# Patient Record
Sex: MALE | Race: WHITE | NOT HISPANIC OR LATINO | Employment: UNEMPLOYED | URBAN - METROPOLITAN AREA
[De-identification: names, ages, dates, MRNs, and addresses within clinical notes are randomized per-mention and may not be internally consistent; named-entity substitution may affect disease eponyms.]

---

## 2020-06-01 ENCOUNTER — COMMUNICATION - HEALTHEAST (OUTPATIENT)
Dept: CARDIOLOGY | Facility: CLINIC | Age: 60
End: 2020-06-01

## 2021-05-05 ENCOUNTER — OFFICE VISIT (OUTPATIENT)
Dept: FAMILY MEDICINE | Facility: CLINIC | Age: 61
End: 2021-05-05
Payer: COMMERCIAL

## 2021-05-05 VITALS
HEART RATE: 98 BPM | WEIGHT: 230.6 LBS | DIASTOLIC BLOOD PRESSURE: 98 MMHG | TEMPERATURE: 98.1 F | RESPIRATION RATE: 16 BRPM | SYSTOLIC BLOOD PRESSURE: 165 MMHG

## 2021-05-05 DIAGNOSIS — M79.2 CERVICAL SPINE ARTHRITIS WITH NERVE PAIN: Primary | ICD-10-CM

## 2021-05-05 DIAGNOSIS — F32.1 CURRENT MODERATE EPISODE OF MAJOR DEPRESSIVE DISORDER WITHOUT PRIOR EPISODE (H): ICD-10-CM

## 2021-05-05 DIAGNOSIS — M47.812 CERVICAL SPINE ARTHRITIS WITH NERVE PAIN: Primary | ICD-10-CM

## 2021-05-05 PROCEDURE — 99204 OFFICE O/P NEW MOD 45 MIN: CPT | Mod: GC | Performed by: FAMILY MEDICINE

## 2021-05-05 RX ORDER — LANOLIN ALCOHOL/MO/W.PET/CERES
3-6 CREAM (GRAM) TOPICAL
Qty: 60 TABLET | Refills: 1 | Status: SHIPPED | OUTPATIENT
Start: 2021-05-05 | End: 2022-09-08

## 2021-05-05 RX ORDER — ACETAMINOPHEN 500 MG
500-1000 TABLET ORAL EVERY 8 HOURS PRN
Qty: 90 TABLET | Refills: 1 | Status: SHIPPED | OUTPATIENT
Start: 2021-05-05 | End: 2022-04-29

## 2021-05-05 RX ORDER — LIDOCAINE 40 MG/G
CREAM TOPICAL
Qty: 5 G | Refills: 3 | Status: SHIPPED | OUTPATIENT
Start: 2021-05-05 | End: 2022-04-29

## 2021-05-05 RX ORDER — TRAZODONE HYDROCHLORIDE 50 MG/1
50 TABLET, FILM COATED ORAL
Qty: 60 TABLET | Refills: 1 | Status: SHIPPED | OUTPATIENT
Start: 2021-05-05 | End: 2021-12-14

## 2021-05-05 RX ORDER — IBUPROFEN 800 MG/1
800 TABLET, FILM COATED ORAL EVERY 8 HOURS PRN
Qty: 90 TABLET | Refills: 1 | Status: SHIPPED | OUTPATIENT
Start: 2021-05-05 | End: 2022-04-29

## 2021-05-05 ASSESSMENT — PATIENT HEALTH QUESTIONNAIRE - PHQ9: SUM OF ALL RESPONSES TO PHQ QUESTIONS 1-9: 18
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
## 2021-05-05 NOTE — Clinical Note
Hey Flora! This is the gentleman we were talking about yesterday- think he could really use SMRLs help once Lor gets back. I tried to document thoroughly, but let me know if you have any other questions about him!  He is staying at [facility], and does have a cell phone, so hopefully won't be terribly difficult to get ahold of

## 2021-05-05 NOTE — PROGRESS NOTES
Social Work Note:    Data and Intervention: Consulted with Dr. Palomo regarding patient's housing history and legal situation.     Assessment and Plan: Encouraged Dr. Palomo to put in legal referral. Informed Cameron, nurse working at [facility], of this patient and his needs. This SW can meet with him during next clinic visit.     ROLA Arroyo

## 2021-05-05 NOTE — PATIENT INSTRUCTIONS
Try melatonin first: 1 tablet 2 hours before bed; if not helping do 2 tablets    In a few nights, if not sleeping with melatonin, try the trazodone: start with 0.5 tablet, then try full tablet    Pain:  Try to alternate the ibuprofen and tylenol- both are every 8 hours, so every 4 hours you're either taking the ibuprofen or the tylenol    Our  will be calling you, and so will the the [facility] people     Pharmacy: [facility] on [address]- go [address] up [address] 1 mile, will be on the right at the intersection of [address]    05/07/21   [facility]  Phone: [phone]  Fax: [phone]    Referral, demographics, office visit  faxed to [phone]. They will contact patient to schedule.     Elin Kaufman      05/14/21 ADDENDUM   patient has not heard from [facility] and has an updated phone number since the referral was placed. I called [facility] and they said they did not have the referral. I faxed all documents over to them again and communicated via leaving voicemail for the patient.     Elin Kaufman    05/17/21 addendum:    [facility]  Phone: [phone]  Fax: [phone]    [facility]  [address] [place] [address], [address]  [place] [address]    Appointment:  Monday May 24th  Arrival Time:  1:45pm     Elin Kaufman

## 2021-05-07 NOTE — PROGRESS NOTES
Preceptor Attestation:    I discussed the patient with the resident and evaluated the patient in person. I have verified the content of the note, which accurately reflects my assessment of the patient and the plan of care.   Supervising Physician:  Jozef Taylor MD.

## 2021-05-17 ENCOUNTER — TELEPHONE (OUTPATIENT)
Dept: FAMILY MEDICINE | Facility: CLINIC | Age: 61
End: 2021-05-17

## 2021-05-17 DIAGNOSIS — Z59.00 HOMELESSNESS: Primary | ICD-10-CM

## 2021-05-17 SDOH — ECONOMIC STABILITY - HOUSING INSECURITY: HOMELESSNESS UNSPECIFIED: Z59.00
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
## 2021-05-18 NOTE — TELEPHONE ENCOUNTER
May 18, 2021   Legal Services Referral - Arnett only  Legal referral has been sent to Farideh Pena from [facility].     Scan/Send demographics and referral to [email or web address]    She will review, advise, and contact the patient.     Elin Kaufman

## 2021-05-18 NOTE — TELEPHONE ENCOUNTER
I spoke with Ernesto today to let him know about the following:    - Appt at [facility]   [facility]   [facility]  [address], [address]  [place] [address]     Appointment:  Monday May 24th  Arrival Time:  1:45pm      -Legal referral has been placed     -paperwork that he dropped off is waiting for him at  as he needs to fill this out and send it back.     Elin Kaufman

## 2021-05-26 ENCOUNTER — RECORDS - HEALTHEAST (OUTPATIENT)
Dept: ADMINISTRATIVE | Facility: CLINIC | Age: 61
End: 2021-05-26

## 2021-05-30 ENCOUNTER — RECORDS - HEALTHEAST (OUTPATIENT)
Dept: ADMINISTRATIVE | Facility: CLINIC | Age: 61
End: 2021-05-30

## 2021-06-01 ENCOUNTER — RECORDS - HEALTHEAST (OUTPATIENT)
Dept: ADMINISTRATIVE | Facility: CLINIC | Age: 61
End: 2021-06-01

## 2021-06-05 ENCOUNTER — TRANSFERRED RECORDS (OUTPATIENT)
Dept: HEALTH INFORMATION MANAGEMENT | Facility: CLINIC | Age: 61
End: 2021-06-05

## 2021-06-08 ENCOUNTER — TRANSFERRED RECORDS (OUTPATIENT)
Dept: HEALTH INFORMATION MANAGEMENT | Facility: CLINIC | Age: 61
End: 2021-06-08

## 2021-06-08 NOTE — TELEPHONE ENCOUNTER
----- Message from Marcos Aggarwal DO sent at 6/1/2020  1:57 PM CDT -----  Regarding: RE: [facility] referral  Virtual visit.    ----- Message -----  From: Fatuma Loo RN  Sent: 6/1/2020   8:03 AM CDT  To: Marcos Aggarwal DO  Subject: RAC referral                                     Pt referred to RAC by  ED 5-30 - please review chart and determine if RAC appt necessary, if yes, what type of visit should be scheduled?  mg      
Message rec'd from  6-8-20 @ 0856:    I am removing this order from the RAC workqueue. I called the patient several times, and was never able to get through to the patient on the number that was listed. I contacted the patients brother (emergency contact) and he did not have another number for the patient, but claims he would try to contact the patient via social media.     If the patient calls back, he can schedule as a new patient consult.     Thanks!   Elli Nair       
Noted.  mg  
Response noted and copied to work que.  mg  
no

## 2021-06-10 ENCOUNTER — TELEPHONE (OUTPATIENT)
Dept: FAMILY MEDICINE | Facility: CLINIC | Age: 61
End: 2021-06-10

## 2021-06-10 NOTE — TELEPHONE ENCOUNTER
DIEGO reached out to CURTIS Barnes at [facility], to help Ernesto with care coordination for chronic pain, spine surgery, and physical assessment for SSDI disability application. Cameron informed that Ernesto is no longer at [facility], and now is at [facility].     Ernesto's phone line is disconnected.    Called [facility] and spoke with staff who will pass on a message to have Ernesto call this SW back for care coordination.     ROLA Arroyo

## 2021-07-08 ENCOUNTER — TELEPHONE (OUTPATIENT)
Dept: FAMILY MEDICINE | Facility: CLINIC | Age: 61
End: 2021-07-08

## 2021-07-08 NOTE — TELEPHONE ENCOUNTER
Social Work Note:    Data and Intervention: Patient called this SW and left VM on Weds 7/7, requesting a ride to his pre-op visit with Dr. Britt at 3:10pm on 7/8/21. Was very frustrated in his voicemail, stating that no one is helping him and that they are just leaving him for dead.     This SW was not able to call him back until around 2:30pm on 7/8. Left  offering to help him reschedule his pre-op for spinal surgery and to set up a ride for this.     Assessment and Plan: This SW will be out of office 7/9-7/16. Routing to DIEGO Cm and Dr. Britt to help get him rescheduled.      ROLA Arroyo

## 2021-07-09 NOTE — TELEPHONE ENCOUNTER
This SW reached out to patient to attempt to schedule pre-op. He did not answer.     This SW will try calling him again next week.     AMILCAR Diana

## 2021-07-12 ENCOUNTER — OFFICE VISIT (OUTPATIENT)
Dept: FAMILY MEDICINE | Facility: CLINIC | Age: 61
End: 2021-07-12
Payer: COMMERCIAL

## 2021-07-12 VITALS
SYSTOLIC BLOOD PRESSURE: 117 MMHG | HEIGHT: 72 IN | RESPIRATION RATE: 16 BRPM | TEMPERATURE: 98.3 F | OXYGEN SATURATION: 95 % | BODY MASS INDEX: 31.61 KG/M2 | HEART RATE: 113 BPM | WEIGHT: 233.4 LBS | DIASTOLIC BLOOD PRESSURE: 77 MMHG

## 2021-07-12 DIAGNOSIS — Z01.818 PREOP GENERAL PHYSICAL EXAM: ICD-10-CM

## 2021-07-12 DIAGNOSIS — Z01.818 PREOPERATIVE EXAMINATION: Primary | ICD-10-CM

## 2021-07-12 LAB
ERYTHROCYTE [DISTWIDTH] IN BLOOD BY AUTOMATED COUNT: 13.5 % (ref 10–15)
HCT VFR BLD AUTO: 46.8 % (ref 40–53)
HGB BLD-MCNC: 16.2 G/DL (ref 13.3–17.7)
MCH RBC QN AUTO: 30.1 PG (ref 26.5–33)
MCHC RBC AUTO-ENTMCNC: 34.6 G/DL (ref 31.5–36.5)
MCV RBC AUTO: 87 FL (ref 78–100)
PLATELET # BLD AUTO: 259 10E3/UL (ref 150–450)
RBC # BLD AUTO: 5.39 10E6/UL (ref 4.4–5.9)
WBC # BLD AUTO: 8.3 10E3/UL (ref 4–11)

## 2021-07-12 PROCEDURE — 99213 OFFICE O/P EST LOW 20 MIN: CPT | Mod: GC | Performed by: STUDENT IN AN ORGANIZED HEALTH CARE EDUCATION/TRAINING PROGRAM

## 2021-07-12 PROCEDURE — 85610 PROTHROMBIN TIME: CPT | Performed by: STUDENT IN AN ORGANIZED HEALTH CARE EDUCATION/TRAINING PROGRAM

## 2021-07-12 PROCEDURE — 80048 BASIC METABOLIC PNL TOTAL CA: CPT | Performed by: STUDENT IN AN ORGANIZED HEALTH CARE EDUCATION/TRAINING PROGRAM

## 2021-07-12 PROCEDURE — 93000 ELECTROCARDIOGRAM COMPLETE: CPT | Mod: GC | Performed by: STUDENT IN AN ORGANIZED HEALTH CARE EDUCATION/TRAINING PROGRAM

## 2021-07-12 PROCEDURE — 85027 COMPLETE CBC AUTOMATED: CPT | Performed by: STUDENT IN AN ORGANIZED HEALTH CARE EDUCATION/TRAINING PROGRAM

## 2021-07-12 PROCEDURE — 36415 COLL VENOUS BLD VENIPUNCTURE: CPT | Performed by: STUDENT IN AN ORGANIZED HEALTH CARE EDUCATION/TRAINING PROGRAM

## 2021-07-12 ASSESSMENT — MIFFLIN-ST. JEOR: SCORE: 1906.21

## 2021-07-12 NOTE — PATIENT INSTRUCTIONS

## 2021-07-12 NOTE — PROGRESS NOTES
[facility] [address] [place] [address]  Phone: [phone]  Fax: [phone]  Primary Provider: Lynne Jain  Pre-op Performing Provider: MARCUS CARRIZALES      PREOPERATIVE EVALUATION:  Today's date: 7/12/2021    Ernesto Gomez is a 60 year old male who presents for a preoperative evaluation.    Surgical Information:  Surgery/Procedure: Disc replacement, spinal fusion  Surgery Location: [place]  Surgeon: Synocrop  Surgery Date: 7/13/21  Time of Surgery: 12:00  Where patient plans to recover: At home with family  Fax number for surgical facility: Note does not need to be faxed, will be available electronically in Epic.    Type of Anesthesia Anticipated: General    Assessment & Plan     The proposed surgical procedure is considered INTERMEDIATE risk.    Preoperative examination  Baseline labs obtained prior to procedure, baseline EKG appears Tachycardic with Sinus Rythym with nonspecific ST elevation, patient denies any chest pain. Otherwise asymptomatic. Baseline labs listed below.  - CBC with platelets  - INR  - Basic metabolic panel  - EKG       Risks and Recommendations:  The patient has the following additional risks and recommendations for perioperative complications:   - No identified additional risk factors other than previously addressed    Medication Instructions:  Patient is to take all scheduled medications on the day of surgery    RECOMMENDATION:  APPROVAL GIVEN to proceed with proposed procedure. Diagnostic evaluation is normal, proceed with procedure.    15 minutes spent on the date of the encounter doing chart review, history and exam, documentation and further activities per the note    Subjective     HPI related to upcoming procedure: history of back pain, cervical neck pain and lumbar back pain. Had a C3-4 discectomy with cadaver replacement about 9 years ago per patient.     Preop Questions 7/12/2021   1. Have you ever had a heart attack or  stroke? No   2. Have you ever had surgery on your heart or blood vessels, such as a stent placement, a coronary artery bypass, or surgery on an artery in your head, neck, heart, or legs? No   3. Do you have chest pain with activity? No   4. Do you have a history of  heart failure? No   5. Do you currently have a cold, bronchitis or symptoms of other infection? No   6. Do you have a cough, shortness of breath, or wheezing? No   7. Do you or anyone in your family have previous history of blood clots? No   8. Do you or does anyone in your family have a serious bleeding problem such as prolonged bleeding following surgeries or cuts? No   9. Have you ever had problems with anemia or been told to take iron pills? No   10. Have you had any abnormal blood loss such as black, tarry or bloody stools? No   11. Have you ever had a blood transfusion? No   12. Are you willing to have a blood transfusion if it is medically needed before, during, or after your surgery? Yes   13. Have you or any of your relatives ever had problems with anesthesia? No   14. Do you have sleep apnea, excessive snoring or daytime drowsiness? No   15. Do you have any artifical heart valves or other implanted medical devices like a pacemaker, defibrillator, or continuous glucose monitor? No   16. Do you have artificial joints? No   17. Are you allergic to latex? No       Health Care Directive:  Patient does not have a Health Care Directive or Living Will: Discussed advance care planning with patient; information given to patient to review.    Preoperative Review of :   reviewed - no record of controlled substances prescribed.      Review of Systems  CONSTITUTIONAL: NEGATIVE for fever, chills, change in weight  INTEGUMENTARY/SKIN: NEGATIVE for worrisome rashes, moles or lesions  EYES: NEGATIVE for vision changes or irritation  ENT/MOUTH: NEGATIVE for ear, mouth and throat problems  RESP: NEGATIVE for significant cough or SOB  BREAST: NEGATIVE for  masses, tenderness or discharge  CV: NEGATIVE for chest pain, palpitations or peripheral edema  GI: NEGATIVE for nausea, abdominal pain, heartburn, or change in bowel habits  : NEGATIVE for frequency, dysuria, or hematuria  MUSCULOSKELETAL: NEGATIVE for significant arthralgias or myalgia  NEURO: NEGATIVE for weakness, dizziness or paresthesias  ENDOCRINE: NEGATIVE for temperature intolerance, skin/hair changes  HEME: NEGATIVE for bleeding problems  PSYCHIATRIC: NEGATIVE for changes in mood or affect    Patient Active Problem List    Diagnosis Date Noted     Cervical spine arthritis with nerve pain 05/05/2021     Priority: Medium     Current moderate episode of major depressive disorder without prior episode (H) 05/05/2021     Priority: Medium      Past Medical History:   Diagnosis Date     Alcohol abuse      Cervical disc disease      Cocaine addiction (H)      GERD (gastroesophageal reflux disease)      Tobacco abuse      Past Surgical History:   Procedure Laterality Date     NECK SURGERY       Current Outpatient Medications   Medication Sig Dispense Refill     acetaminophen (TYLENOL) 500 MG tablet Take 1-2 tablets (500-1,000 mg) by mouth every 8 hours as needed for mild pain 90 tablet 1     ibuprofen (ADVIL/MOTRIN) 800 MG tablet Take 1 tablet (800 mg) by mouth every 8 hours as needed for moderate pain 90 tablet 1     lidocaine (LMX4) 4 % external cream Apply topically once as needed for mild pain 5 g 3     melatonin 3 MG tablet Take 1-2 tablets (3-6 mg) by mouth nightly as needed for sleep 60 tablet 1     traZODone (DESYREL) 50 MG tablet Take 1 tablet (50 mg) by mouth nightly as needed for sleep 60 tablet 1       No Known Allergies     Social History     Tobacco Use     Smoking status: Current Every Day Smoker     Smokeless tobacco: Never Used   Substance Use Topics     Alcohol use: Not on file     No family history on file.  History   Drug Use Not on file         Objective     /77 (BP Location: Right arm,  "Patient Position: Sitting, Cuff Size: Adult Large)   Pulse 113   Temp 98.3  F (36.8  C) (Oral)   Resp 16   Ht 1.82 m (5' 11.65\")   Wt 105.9 kg (233 lb 6.4 oz)   SpO2 95%   BMI 31.96 kg/m      Physical Exam    GENERAL APPEARANCE: healthy, alert and no distress     EYES: EOMI,  PERRL     HENT: ear canals and TM's normal and nose and mouth without ulcers or lesions     NECK: no adenopathy, no asymmetry, masses, or scars and thyroid normal to palpation     RESP: lungs clear to auscultation - no rales, rhonchi or wheezes     CV: regular rates and rhythm, normal S1 S2, no S3 or S4 and no murmur, click or rub     ABDOMEN:  soft, nontender, no HSM or masses and bowel sounds normal     MS: extremities normal- no gross deformities noted, no evidence of inflammation in joints, FROM in all extremities.     SKIN: no suspicious lesions or rashes     NEURO: Normal strength and tone, sensory exam grossly normal, mentation intact and speech normal     PSYCH: mentation appears normal. and affect normal/bright     LYMPHATICS: No cervical adenopathy    Recent Labs   Lab Test 05/30/20  1652 05/29/20  2341 05/29/20  2248 05/23/20 2057 05/23/20 2056   HGB 16.0  --  16.3   < >  --      --  287   < >  --    INR  --   --   --   --  0.96    142  --   --   --    POTASSIUM 4.2 4.1  --   --   --    CR 1.00 0.81  --    < >  --     < > = values in this interval not displayed.        Diagnostics:  Recent Results (from the past 24 hour(s))   CBC with platelets    Collection Time: 07/12/21  4:53 PM   Result Value Ref Range    WBC Count 8.3 4.0 - 11.0 10e3/uL    RBC Count 5.39 4.40 - 5.90 10e6/uL    Hemoglobin 16.2 13.3 - 17.7 g/dL    Hematocrit 46.8 40.0 - 53.0 %    MCV 87 78 - 100 fL    MCH 30.1 26.5 - 33.0 pg    MCHC 34.6 31.5 - 36.5 g/dL    RDW 13.5 10.0 - 15.0 %    Platelet Count 259 150 - 450 10e3/uL      EKG: tachycardic but SR, normal axis, normal intervals, nonspecific ST/T changes, no LVH by voltage criteria.    Revised " Cardiac Risk Index (RCRI):  The patient has the following serious cardiovascular risks for perioperative complications:   - No serious cardiac risks = 0 points          Signed Electronically by: Levi Perez MD  Copy of this evaluation report is provided to requesting physician.

## 2021-07-12 NOTE — PROGRESS NOTES
Preceptor Attestation:    I discussed the patient with the resident and evaluated the patient in person. I have verified the content of the note, which accurately reflects my assessment of the patient and the plan of care.   Supervising Physician:  Sherman Polo MD.

## 2021-07-13 ENCOUNTER — TRANSFERRED RECORDS (OUTPATIENT)
Dept: HEALTH INFORMATION MANAGEMENT | Facility: CLINIC | Age: 61
End: 2021-07-13

## 2021-07-13 LAB
ANION GAP SERPL CALCULATED.3IONS-SCNC: 14 MMOL/L (ref 5–18)
BUN SERPL-MCNC: 13 MG/DL (ref 8–22)
CALCIUM SERPL-MCNC: 9.9 MG/DL (ref 8.5–10.5)
CHLORIDE BLD-SCNC: 107 MMOL/L (ref 98–107)
CO2 SERPL-SCNC: 23 MMOL/L (ref 22–31)
CREAT SERPL-MCNC: 1.04 MG/DL (ref 0.7–1.3)
GFR SERPL CREATININE-BSD FRML MDRD: 78 ML/MIN/1.73M2
GLUCOSE BLD-MCNC: 98 MG/DL (ref 70–125)
INR PPP: 0.99 (ref 0.85–1.15)
POTASSIUM BLD-SCNC: 4.1 MMOL/L (ref 3.5–5)
SODIUM SERPL-SCNC: 144 MMOL/L (ref 136–145)

## 2021-07-13 NOTE — TELEPHONE ENCOUNTER
Patient arrived for his pre op yesterday, with Dr. Perez. Per Epic, it appears that surgery is scheduled for today. No further f/u needed at this time.     Routing to ROLA Hernandez for FYI.     AMILCAR Diana

## 2021-08-17 ENCOUNTER — TELEPHONE (OUTPATIENT)
Dept: FAMILY MEDICINE | Facility: CLINIC | Age: 61
End: 2021-08-17

## 2021-08-17 DIAGNOSIS — M79.2 CERVICAL SPINE ARTHRITIS WITH NERVE PAIN: Primary | ICD-10-CM

## 2021-08-17 DIAGNOSIS — M47.812 CERVICAL SPINE ARTHRITIS WITH NERVE PAIN: Primary | ICD-10-CM

## 2021-08-17 NOTE — TELEPHONE ENCOUNTER
[facility] phone call message - order or referral request for patient:     Order or referral being requested: [facility] for a 2nd opinion.       Additional Comments: pt has back surgery with [facility] and says he is not healing and wants a second opinion.     OK to leave a message on voice mail? Yes    Primary language: English      needed? No    Call taken on August 17, 2021 at 10:57 AM by Elin Kaufman

## 2021-08-17 NOTE — TELEPHONE ENCOUNTER
Dr. Palomo placed this referral.     Called back patient and was only able to leave a .     Elin Kaufman

## 2021-09-16 ENCOUNTER — TRANSFERRED RECORDS (OUTPATIENT)
Dept: HEALTH INFORMATION MANAGEMENT | Facility: CLINIC | Age: 61
End: 2021-09-16

## 2021-10-26 ENCOUNTER — OFFICE VISIT (OUTPATIENT)
Dept: FAMILY MEDICINE | Facility: CLINIC | Age: 61
End: 2021-10-26
Payer: COMMERCIAL

## 2021-10-26 VITALS
WEIGHT: 230 LBS | TEMPERATURE: 98.8 F | BODY MASS INDEX: 31.5 KG/M2 | OXYGEN SATURATION: 97 % | DIASTOLIC BLOOD PRESSURE: 102 MMHG | HEART RATE: 115 BPM | RESPIRATION RATE: 16 BRPM | SYSTOLIC BLOOD PRESSURE: 140 MMHG

## 2021-10-26 DIAGNOSIS — Z23 HIGH PRIORITY FOR 2019-NCOV VACCINE: ICD-10-CM

## 2021-10-26 DIAGNOSIS — F33.1 MODERATE EPISODE OF RECURRENT MAJOR DEPRESSIVE DISORDER (H): ICD-10-CM

## 2021-10-26 DIAGNOSIS — G89.28 OTHER CHRONIC POSTPROCEDURAL PAIN: Primary | ICD-10-CM

## 2021-10-26 DIAGNOSIS — J30.2 SEASONAL ALLERGIC RHINITIS DUE TO FUNGAL SPORES: ICD-10-CM

## 2021-10-26 DIAGNOSIS — Z23 NEED FOR PROPHYLACTIC VACCINATION AND INOCULATION AGAINST INFLUENZA: ICD-10-CM

## 2021-10-26 PROCEDURE — 90686 IIV4 VACC NO PRSV 0.5 ML IM: CPT

## 2021-10-26 PROCEDURE — 91300 COVID-19,PF,PFIZER (12+ YRS): CPT

## 2021-10-26 PROCEDURE — 0001A COVID-19,PF,PFIZER (12+ YRS): CPT

## 2021-10-26 PROCEDURE — 99214 OFFICE O/P EST MOD 30 MIN: CPT | Mod: 25

## 2021-10-26 PROCEDURE — 90471 IMMUNIZATION ADMIN: CPT

## 2021-10-26 RX ORDER — HYDROCODONE BITARTRATE AND ACETAMINOPHEN 5; 325 MG/1; MG/1
1 TABLET ORAL
COMMUNITY
Start: 2020-05-30 | End: 2021-12-14

## 2021-10-26 RX ORDER — KETOROLAC TROMETHAMINE 10 MG/1
TABLET, FILM COATED ORAL
COMMUNITY
Start: 2021-08-28 | End: 2021-12-14

## 2021-10-26 RX ORDER — DIPHENHYDRAMINE HCL 25 MG
25 TABLET ORAL EVERY 6 HOURS PRN
Qty: 90 TABLET | Refills: 3 | Status: SHIPPED | OUTPATIENT
Start: 2021-10-26 | End: 2022-08-03

## 2021-10-26 RX ORDER — LIDOCAINE 40 MG/G
CREAM TOPICAL
COMMUNITY
Start: 2021-05-05 | End: 2022-09-08

## 2021-10-26 RX ORDER — OXYCODONE HYDROCHLORIDE 5 MG/1
5 TABLET ORAL
COMMUNITY
Start: 2021-08-09 | End: 2021-12-14

## 2021-10-26 RX ORDER — DULOXETIN HYDROCHLORIDE 30 MG/1
30 CAPSULE, DELAYED RELEASE ORAL DAILY
Qty: 60 CAPSULE | Refills: 0 | Status: SHIPPED | OUTPATIENT
Start: 2021-10-26 | End: 2021-12-22

## 2021-10-26 RX ORDER — GABAPENTIN 300 MG/1
300 CAPSULE ORAL 3 TIMES DAILY
Qty: 90 CAPSULE | Refills: 4 | Status: SHIPPED | OUTPATIENT
Start: 2021-10-26 | End: 2022-04-29

## 2021-10-26 RX ORDER — ERGOCALCIFEROL 1.25 MG/1
CAPSULE, LIQUID FILLED ORAL
COMMUNITY
Start: 2021-10-12 | End: 2022-09-08

## 2021-10-26 RX ORDER — AMOXICILLIN 250 MG
1-4 CAPSULE ORAL
COMMUNITY
Start: 2021-07-15 | End: 2021-12-14

## 2021-10-26 RX ORDER — OXYCODONE HYDROCHLORIDE 5 MG/1
TABLET ORAL
COMMUNITY
Start: 2021-08-09 | End: 2021-12-14

## 2021-10-26 RX ORDER — METHYLPREDNISOLONE 4 MG
TABLET, DOSE PACK ORAL
COMMUNITY
Start: 2021-07-29 | End: 2021-12-14

## 2021-10-26 RX ORDER — DOCUSATE SODIUM AND SENNOSIDES 8.6; 5 MG/1; MG/1
TABLET, FILM COATED ORAL
COMMUNITY
Start: 2021-07-15 | End: 2021-12-14

## 2021-10-26 NOTE — PATIENT INSTRUCTIONS
Web site for chemical use help (alcohol or drug):  - Fast tracker [place]    - Start gabapentin 300mg in the evening for the first 2 days. Take 300mg in the morning and evening for 2 days. Take 300mg in the morning, afternoon, and evening for the rest of the time.  - Start Cymbalta 30mg daily  - Start benadryl 25 mg q6h a day as needed.    Community Mental Health Agencies:    [facility]   [address]   [address]  [place] [address]  [phone] (for appointments)    [facility]  [address]  [place] [address]  Phone:  [phone]    [facility]  [address] [place] [address]  [phone]    [facility]  [address], [place] [address]  [phone]    [facility]  [address]  [place] [address]  [phone]    Community Psychiatry Agencies:    [facility]  [address]  [place], [place] [address]  [phone]    [facility]  [address]   [address]  [place] [address]  [phone] (for appointments)    [facility]  [address]  [place] [address]  Phone:  [phone]    [facility]  [address] [place] [address]  [phone]    [facility]  [address]  [place] [address]  [phone]    [facility] - offers psychiatry and psychology services across the lifespan  [facility]  [address] [place] [address]  [address]  [place], [place]  [phone]    Children s Mental Health Agencies:    [facility]  [address] [place] [address]  [phone]    [facility]  [address] [place] [address]  [place] [address]  [phone]    [facility]  [address] [place] [address]  [phone]    [facility]  [address] [place] [address]  Phone: [phone]    [facility]  [address] [place] [address]  [phone] (for appointments)    [facility] [address] [address]  Phone:  [phone]      Teen Mental Health Agencies:    [facility]  [address] [place] [address]  Call [phone] to schedule an appointment.    [address] [address]  [phone]    [facility]  [address] [place] [address]  [phone]    [facility]  [address] [place] [address]  [phone] (for appointments)  Fax: [phone]    [facility]  [address] [address]  Phone:  [phone]

## 2021-10-26 NOTE — PROGRESS NOTES
[facility] Visit      Assessment & Plan   Ernesto Gomez is a 60 year old male with the past medical history of cervical spine arthritis with nerve pain and current moderate episode of major depressive disorder without prior episode. He presents to [facility] to discuss mold in his home, pain medication, and depression.    Other chronic postprocedural pain  Patient states he has been having more fatigue, jitteriness, and sweating then he had before. Differential could be pain from recent surgery or alcohol withdraw. He states he he usually drinks 2 or 3 alcoholic drinks a night, but has not had any drinks in the last 3 days. He states that he understands I will not prescribe him any opioids. He must return to his surgeon for those medications. He will have 6 weeks of physical therapy biweekly and also be seen by a pain clinic.  - gabapentin (NEURONTIN) 300 MG capsule  Dispense: 90 capsule; Refill: 4 - Start gabapentin 300mg in the evening for the first 2 days. Take 300mg in the morning and evening for 2 days. Take 300mg in the morning, afternoon, and evening for the rest of the time.  - DULoxetine (CYMBALTA) 30 MG capsule  Dispense: 60 capsule; Refill: 0    Seasonal allergic rhinitis due to fungal spores  Patient has new mold growing in his sink. He has had increased head pressure, rhinorrhea, sneezing, and watery eyes.  - diphenhydrAMINE (BENADRYL) 25 MG tablet  Dispense: 90 tablet; Refill: 3    Moderate episode of recurrent major depressive disorder (H)  He was contracted for safety. He also has a referral to mental health through [facility].  - DULoxetine (CYMBALTA) 30 MG capsule  Dispense: 60 capsule; Refill: 0    High priority for 2019-nCoV vaccine  - COVID-19,PF,PFIZER    Need for prophylactic vaccination and inoculation against influenza  - INFLUENZA VACCINE IM > 6 MONTHS VALENT IIV4 (AFLURIA/FLUZONE)      Review of prior external note(s) from - CareEverywhere  "information from [facility] reviewed     Return in about 4 weeks (around 11/23/2021) for Follow up for changes in medication.      Patient was staffed with supervising physician, Dr. Eliezer Davies.    Obdulia Salinas MD, PGY1  [facility]      Subjective   Ernesto Gomez is a 60 year old male with the past medical history of cervical spine arthritis with nerve pain and current moderate episode of major depressive disorder without prior episode. He presents to [facility] to discuss mold in his home, pain medication, and depression.    HPI  Patient had surgery on 07/13/2021 for hardware removal of C5-6, anterior cervical decompression, and fusion of C3-5. Patient was given methylprednisolone and oxycodone.  Patient has been to the emergency department on 2 different occasions for pain secondary to the surgery. On 10/12, the surgeon recommended 6 weeks of biweekly physical therapy, but the patient has not attended yet.  He is also scheduled to follow with a pain clinic on November 4. He specifically asks for Vicodin. Patient is here today to talk about pain control. He states he is very frustrated. He cannot get a restful night of sleep. He feels depressed, like he is not able to keep going through life like this. He states he has a mental health referral out with [facility], and another psychiatrist or psychologist should be calling him soon to discuss his depressed mood. He denies suicidal ideations and harming others.    Patient states that in the past week he has had mold growing in his kitchen.  His sink backed up and now there is \"black junk everywhere.\"  He states he has talked to his landlord but they have not done anything to remedy the situation.  He himself has not cleaned up the mess.  He states he has been sneezing and coughing more.  He also states that he has had rhinorrhea, fatigue, nausea, sweating, increased head pressure, and jitteriness.    Patient states he has used " cocaine in the past but has not used it in the near present time. He states he drinks about 2-3 alcoholic drinks daily.  He has not had anything in the past 3 days. He is a current every day smoker.    - I personally review outside notes.    Review of Systems   General: endorses sweating and fatigue, denies fevers and chills  Head: Endorses increased pressure, denies headache and trauma  Ears: denies ringing in ears, drainage, decreased hearing  Eyes: Endorses watering, denies pain, blurry or double vision, dryness  Nose: Endorses congestion and discharge  Neck: Endorses neck pain and stiffness  Respiratory: Endorses sneezing, denies cough, hemoptysis, wheezing, shortness of breath  Cardiovascular: denies chest pain, palpitations, dyspnea, edema  Gastrointestinal: denies nausea, constipation, diarrhea, and change in bowel habits  Urinary: denies frequency, urgency, incontinence, dysuria  Musculoskeletal: Endorses back pain and body aches  Psychiatric: Endorses depression and frustration        Objective   BP (!) 140/102 (BP Location: Left arm, Patient Position: Sitting, Cuff Size: Adult Regular)   Pulse 115   Temp 98.8  F (37.1  C) (Oral)   Resp 16   Wt 104.3 kg (230 lb)   SpO2 97%   BMI 31.50 kg/m    Body mass index is 31.5 kg/m .    General appearance: alert, in distress, cooperative, appears stated age, diaphoretic  Head: normocephalic, without obvious abnormalities, atraumatic  Eyes: Pink sclera, watering, PERRL, EOM intact  Ears: hearing grossly intact  Nose: nares normal, septum midline, no visible drainage but is sniffling  Throat: lips, mucosa, and tongue normal, teeth and gums normal, no palpable lymphadenopathy  Lung: clear to auscultation bilaterally, no wheezing, coughing, or use of accessory muscles  Chest wall: no tenderness  Heart: regular rate and rhythm, S1, S2 normal, no murmur, click, rub, or gallop  Abdomen: limited by obesity, soft, non-tender, bowel sounds present in all four quadrants,  no masses or organomegaly  Extremities: warm, dry, atraumatic, no cyanosis  Pulses: 2+ and symmetric  Skin: normal color, texture, and turgor  Psychologic: Depressed mood       Crisis Assessment and Follow Up Plan     PATIENT RISK ASSESSMENT:  Today Ernesto Gomez reports depression. In addition, he has notable risk factors for self-harm, including hopelessness, lives alone/ isolated, severe anxiety and severe insomnia. However, risk is mitigated by no h/o suicide attempt, no plan or intent and Christian beliefs.     Therefore, based on all available evidence including the factors cited above, he does not appear to be at imminent risk for self-harm, does not meet criteria for an emergency hold, and therefore involuntary hospitalization will not be pursued at this time. Ernesto Gomez was provided with the phone number for emergency mental health services and was encouraged to call these local crisis numbers, 911, or visit a local emergency room if thoughts of suicide or homicide were to arise and/or if he were to be in acute distress. The patient agreed to utilize these services as indicated if the need were to arise.      ----- Service Performed and Documented by Resident or Fellow ------

## 2021-10-26 NOTE — PROGRESS NOTES
Preceptor Attestation:    I discussed the patient with the resident and evaluated the patient in person. I have verified the content of the note, which accurately reflects my assessment of the patient and the plan of care.   Supervising Physician:  Eliezer Davies MD.

## 2021-11-24 ENCOUNTER — OFFICE VISIT (OUTPATIENT)
Dept: FAMILY MEDICINE | Facility: CLINIC | Age: 61
End: 2021-11-24
Payer: COMMERCIAL

## 2021-11-24 VITALS
RESPIRATION RATE: 16 BRPM | SYSTOLIC BLOOD PRESSURE: 131 MMHG | TEMPERATURE: 97.9 F | OXYGEN SATURATION: 96 % | DIASTOLIC BLOOD PRESSURE: 87 MMHG | HEART RATE: 95 BPM

## 2021-11-24 DIAGNOSIS — Z23 HIGH PRIORITY FOR 2019-NCOV VACCINE: Primary | ICD-10-CM

## 2021-11-24 DIAGNOSIS — M47.812 CERVICAL SPINE ARTHRITIS WITH NERVE PAIN: ICD-10-CM

## 2021-11-24 DIAGNOSIS — M79.2 CERVICAL SPINE ARTHRITIS WITH NERVE PAIN: ICD-10-CM

## 2021-11-24 DIAGNOSIS — Z91.148 NONADHERENCE TO MEDICATION: ICD-10-CM

## 2021-11-24 PROCEDURE — 0002A COVID-19,PF,PFIZER (12+ YRS): CPT

## 2021-11-24 PROCEDURE — 99213 OFFICE O/P EST LOW 20 MIN: CPT | Mod: 25

## 2021-11-24 PROCEDURE — 91300 COVID-19,PF,PFIZER (12+ YRS): CPT

## 2021-11-24 RX ORDER — LIDOCAINE/PRILOCAINE 2.5 %-2.5%
CREAM (GRAM) TOPICAL ONCE
Qty: 5800 G | Refills: 0 | Status: SHIPPED | OUTPATIENT
Start: 2021-11-24 | End: 2023-02-10

## 2021-11-24 NOTE — PROGRESS NOTES
[facility] Visit      Assessment & Plan   Ernesto Gomez is a 60 year old male with the past medical history of cervical spine disease with cervical spinal fusion on 07/13/2021, degenerative joint disease, and tobacco use. He presents to [facility] for COVID-19 vaccination and follow-up with medication changes.    High priority for 2019-nCoV vaccine  Second dose of vaccination was given today.  - COVID-19,PF,PFIZER (12+ Yrs PURPLE LABEL)    Cervical spine arthritis with nerve pain, s/p cervical spine fusion  Patient is following with spinal surgery. He has also seeing a chiropractor. He will begin working with physical therapy soon and the pain clinic. He states he will schedule an appointment with [facility].  Lidocaine cream was prescribed to help with pain as needed.  - lidocaine-prilocaine (EMLA) 2.5-2.5 % external cream  Dispense: 5800 g; Refill: 0    Nonadherence to medications  Patient is on multiple medications and states he forgets his medications frequently. It was stressed with the patient how important it is to take medication and take it on a regular basis for full effectiveness.  - He was given a pillbox which contained enough spots for a.m. and p.m. medications for 1 week.  - He states he is having an individual come into his house and help him organize his medications tomorrow.  -Trazodone was not refilled due to his use of gabapentin for help with sleep.  - Methylprednisolone will be held at this time due to it being prescribed after his surgery and his inconsistent medication taking      Return in about 3 months (around 2/24/2022) for Follow up for continuity of care..      Patient was staffed with supervising physician, Dr. Jozef Taylor.    Obdulia Salinas MD, PGY1  [facility]      Subjective   Ernesto Gomez is a 60 year old male with the past medical history of cervical spine disease with cervical spinal fusion on 07/13/2021, degenerative joint disease, and  "tobacco use. He presents to [facility] for COVID-19 vaccination and follow-up with medication changes.      HPI  Patient states he has been nonadherent to his medications. He states he forgets medications frequently. He states he has an individual who will be coming into his house starting tomorrow to help him organize his medications.    Patient met with his spinal surgeon yesterday. According to the patient, he is healing well. He was under the impression spine surgery was going to prescribe a lidocaine patch. However, when he went to the pharmacy, he was only able to  gabapentin. Future plans for treatment include PT, chiropractor, pain clinic visits, and CDI. He has appointments set up with physical therapy already.  He is seeing a chiropractor today. Spine surgery has given him location that the chiropractor should not adjust. He is setting up information with a pain clinic. He states he is going to make an appointment with CDI.  As for medications, patient states he has only been taking gabapentin at night to help him sleep. He has been taking 1 to 3 pills at that time. He has ran out of trazodone for \"a long time.\" He states he has not been taking his duloxetine daily due to the fact that he forgets. He was given a steroid pack after his surgery which he has not been taking consistently. He was encouraged to stop taking this at this time.    Review of Systems   General: denies fever, chills, and fatigue  Skin: denies rashes, itching, dryness. Endorses bumps on posterior hands bilaterally  Head: endorses chronic headache  Ears: denies ringing in ears, drainage, decreased hearing  Eyes: denies pain, blurry or double vision, dryness  Neck: endorses pain and stiffness  Respiratory: denies cough, hemoptysis, wheezing, shortness of breath  Cardiovascular: denies chest pain, palpitations, dyspnea, edema  Gastrointestinal: denies nausea, constipation, diarrhea, and change in bowel habits  Urinary: denies " frequency, urgency, incontinence, dysuria  Musculoskeletal: endorses lower back pain and cervical neck pain  Psychiatric: denies depression, memory loss, hallucinations, anxiety         Objective   /87 (BP Location: Left arm, Patient Position: Sitting, Cuff Size: Adult Large)   Pulse 95   Temp 97.9  F (36.6  C) (Oral)   Resp 16   SpO2 96%   There is no height or weight on file to calculate BMI.    General appearance: alert, in no distress, cooperative, appears stated age, better appearance than last visit, wearing bone stimulator  Head: normocephalic, without obvious abnormalities, atraumatic  Eyes: conjunctivae/corneas clear  Ears: hearing grossly intact  Lung: clear to auscultation bilaterally, no wheezing, coughing, or use of accessory muscles  Chest wall: no tenderness  Heart: regular rate and rhythm, S1, S2 normal, no murmur, click, rub, or gallop  Extremities: warm, dry, atraumatic, no cyanosis  Pulses: 2+ and symmetric  Skin: hypopigmented, small, regular circular borders, raised spots on posterior hands bilaterally - nonconerning (start of seborrheic keratoses), normal color, texture, and turgor  Musculoskeletal:   Neurologic: similar strength and sensation bilaterally in upper and lower extremities  Psychologic: normal mood     ----- Service Performed and Documented by Resident or Fellow ------

## 2021-11-24 NOTE — PATIENT INSTRUCTIONS
Thank you for seeing me today!    - Go to PT appointments  - Make appointment with CDI clinic  - Continue to work with chronic pain clinic  - Continue to wear bone stimulator  - Continue to follow with spine surgery  - Take duloxetine once a day for effect   -  lidocaine patches from pharmacy  -  pill box from pharmacy as well    Please call the clinic with any questions.

## 2021-12-02 ENCOUNTER — OFFICE VISIT (OUTPATIENT)
Dept: FAMILY MEDICINE | Facility: CLINIC | Age: 61
End: 2021-12-02
Payer: COMMERCIAL

## 2021-12-02 ENCOUNTER — ANCILLARY PROCEDURE (OUTPATIENT)
Dept: GENERAL RADIOLOGY | Facility: CLINIC | Age: 61
End: 2021-12-02
Payer: COMMERCIAL

## 2021-12-02 VITALS
SYSTOLIC BLOOD PRESSURE: 147 MMHG | DIASTOLIC BLOOD PRESSURE: 89 MMHG | TEMPERATURE: 98.1 F | BODY MASS INDEX: 31.03 KG/M2 | RESPIRATION RATE: 18 BRPM | HEART RATE: 99 BPM | OXYGEN SATURATION: 96 % | WEIGHT: 226.6 LBS

## 2021-12-02 DIAGNOSIS — M79.672 LEFT FOOT PAIN: ICD-10-CM

## 2021-12-02 DIAGNOSIS — M54.50 LUMBAR BACK PAIN: ICD-10-CM

## 2021-12-02 DIAGNOSIS — B35.1 ONYCHOMYCOSIS: Primary | ICD-10-CM

## 2021-12-02 LAB
ALBUMIN SERPL-MCNC: 4.1 G/DL (ref 3.5–5)
ALP SERPL-CCNC: 88 U/L (ref 45–120)
ALT SERPL W P-5'-P-CCNC: 38 U/L (ref 0–45)
ANION GAP SERPL CALCULATED.3IONS-SCNC: 12 MMOL/L (ref 5–18)
AST SERPL W P-5'-P-CCNC: 23 U/L (ref 0–40)
BILIRUB SERPL-MCNC: 0.3 MG/DL (ref 0–1)
BUN SERPL-MCNC: 13 MG/DL (ref 8–22)
CALCIUM SERPL-MCNC: 9.7 MG/DL (ref 8.5–10.5)
CHLORIDE BLD-SCNC: 109 MMOL/L (ref 98–107)
CO2 SERPL-SCNC: 21 MMOL/L (ref 22–31)
CREAT SERPL-MCNC: 0.85 MG/DL (ref 0.7–1.3)
GFR SERPL CREATININE-BSD FRML MDRD: >90 ML/MIN/1.73M2
GLUCOSE BLD-MCNC: 105 MG/DL (ref 70–125)
POTASSIUM BLD-SCNC: 4 MMOL/L (ref 3.5–5)
PROT SERPL-MCNC: 7.5 G/DL (ref 6–8)
SODIUM SERPL-SCNC: 142 MMOL/L (ref 136–145)

## 2021-12-02 PROCEDURE — 73630 X-RAY EXAM OF FOOT: CPT | Mod: LT | Performed by: RADIOLOGY

## 2021-12-02 PROCEDURE — 80053 COMPREHEN METABOLIC PANEL: CPT | Performed by: FAMILY MEDICINE

## 2021-12-02 PROCEDURE — 36415 COLL VENOUS BLD VENIPUNCTURE: CPT | Performed by: FAMILY MEDICINE

## 2021-12-02 PROCEDURE — 99214 OFFICE O/P EST MOD 30 MIN: CPT | Mod: GC | Performed by: FAMILY MEDICINE

## 2021-12-02 NOTE — PROGRESS NOTES
Preceptor Attestation:    I discussed the patient with the resident and evaluated the patient in person. I personally reviewed the imaging and agree with the interpretation documented by the resident. I have verified the content of the note, which accurately reflects my assessment of the patient and the plan of care.   Supervising Physician:  Jamal Veliz MD.

## 2021-12-02 NOTE — Clinical Note
Sorry for the wild card-- forgot to finish the exam part! W Plasty Text: The lesion was extirpated to the level of the fat with a #15 scalpel blade.  Given the location of the defect, shape of the defect and the proximity to free margins a W-plasty was deemed most appropriate for repair.  Using a sterile surgical marker, the appropriate transposition arms of the W-plasty were drawn incorporating the defect and placing the expected incisions within the relaxed skin tension lines where possible.    The area thus outlined was incised deep to adipose tissue with a #15 scalpel blade.  The skin margins were undermined to an appropriate distance in all directions utilizing iris scissors.  The opposing transposition arms were then transposed into place in opposite direction and anchored with interrupted buried subcutaneous sutures.

## 2021-12-02 NOTE — PROGRESS NOTES
[facility] MEDICINE CLINIC    Assessment/Plan:    Onychomycosis  Patient denies using alcohol heavily anymore; says he has a few beers a week. Will get CMP to evaluate for any signs of liver dysfunction, then send onchomycosis  We discussed the importance of avoidance of overdoing alcohol with risk of liver impairment if he does this with the terbinafine on board.  - Comprehensive metabolic panel  -We will send terbinafine if the CMP looks okay.    Left foot pain  Will get XR to look for any occult fractures, although no history of injury.  X-ray looks fairly benign on my read but awaiting radiology overread.  Patient has lidocaine cream to  still the pharmacy, recommended applying that to the foot area and continuing the ibuprofen, Tylenol he is already been doing  -Referral to podiatry sent  - XR Foot Left G/E 3 Views    Lumbar back pain  He had been referred to pain clinic but will not be able to get in with them for the next month with the intake process.  He had imaging done at [facility] and his therapist had told him that his doctor should be able to put in orders for potential spinal injections in the lumbar area.  In the past he is mostly just talked about cervical neck pain with his spinal surgeon; we discussed that I his primary care physician would not normally put in orders for spinal injections and that I cannot see any imaging that he has done at [facility] for the lumbar spine.  -Recommended patient get back in with his spinal surgery team to discuss lumbar back pain and potential interventions        Lynne Maxwell MD  PGY3, Family Medicine    I staffed with Dr. Florez, who agrees with my assessment and plan.    Review of prior external note(s) from - [facility] information from [facility] reviewed  Ordering of each unique test  Prescription drug management       Ernesto Gomez is a 60 year old male with a PMH of   Patient Active Problem List   Diagnosis     Cervical spine arthritis  with nerve pain     Current moderate episode of major depressive disorder without prior episode (H)     DJD (degenerative joint disease)     Tobacco abuse     GERD (gastroesophageal reflux disease)     ETOH abuse     Cocaine addiction (H)     presenting to clinic today with a chief complaint of:    Patient presents with:  Referral: for lower back  Musculoskeletal Problem: left foot pain x 2 weeks.    Pain over the dorsum of the 2-4 metatarsals. When it's bad it's an 8-9/10. Has been progressively worsening over the past two weeks to this point. Worst with putting weight on it or when flexing foot up.  History of chronic spinal pain, so already on high dose NSAIDs, duloxetine, gapapentin, Tylenol.  Has noticed some swelling as well, especially last night.  No known injuries.  Walks to the store which is about 5 blocks once a week but otherwise no significant weightbearing physical activities.    He also has toenail fungus on bilateral great toes and left second toe.  He has been trying a toenail fungus paint they have been applying daily for the last year without much improvement.  He is interested in more treatment for this.  He says that he no longer drinks alcohol as heavy as he used to (EtOH abuse is on his medical history list).  He says he only has a few drinks a week now.      He has a history of back and neck pain and has had cervical neck surgeries in the past with [facility] at [facility].  Per his [facility] notes they had not been commenting on his lumbar back pain and had been focusing on the cervical neck issues, for which they had recommended a pain referral.  However over the past few months his lumbar back pain has been bothering him the most.   Takes a 30 days to get in with pain with the intake process.  He was working with physical therapy and they were noticing some radiculopathy symptoms and lumbar back pain impairing activity; they recommended him to get in with [facility] for spinal injections.   They said that the doctor should be able to send the referral for what kinds of injections and wear.  He has had imaging at [facility] before of his lumbar spine in the last few months, however I cannot see this imaging.      Current Outpatient Medications   Medication Sig Dispense Refill     acetaminophen (TYLENOL) 500 MG tablet Take 1-2 tablets (500-1,000 mg) by mouth every 8 hours as needed for mild pain 90 tablet 1     diphenhydrAMINE (BENADRYL) 25 MG tablet Take 1 tablet (25 mg) by mouth every 6 hours as needed for itching or allergies 90 tablet 3     DULoxetine (CYMBALTA) 30 MG capsule Take 1 capsule (30 mg) by mouth daily 60 capsule 0     gabapentin (NEURONTIN) 300 MG capsule Take 1 capsule (300 mg) by mouth 3 times daily Take 1 tablet at bedtime for first 2 days. Then take 1 in morning and 1 in afternoon for 2 days. Then take 3 times day. 90 capsule 4     HYDROcodone-acetaminophen (NORCO) 5-325 MG tablet Take 1 tablet by mouth       ibuprofen (ADVIL/MOTRIN) 800 MG tablet Take 1 tablet (800 mg) by mouth every 8 hours as needed for moderate pain 90 tablet 1     ketorolac (TORADOL) 10 MG tablet TAKE 1 TABLET BY MOUTH EVERY 6 HOURS FOR UP TO 3 DAYS AS NEEDED FOR PAIN       ketorolac (TORADOL) 10 MG tablet TAKE 1 TABLET BY MOUTH EVERY 6 HOURS FOR UP TO 3 DAYS AS NEEDED FOR PAIN       lidocaine (LMX4) 4 % external cream Apply topically once as needed for mild pain 5 g 3     lidocaine-prilocaine (EMLA) 2.5-2.5 % external cream Apply topically once for 1 dose 5800 g 0     melatonin 3 MG tablet Take 1-2 tablets (3-6 mg) by mouth nightly as needed for sleep 60 tablet 1     methylPREDNISolone (MEDROL DOSEPAK) 4 MG tablet therapy pack Take by mouth as instructed per packaging.       methylPREDNISolone (MEDROL DOSEPAK) 4 MG tablet therapy pack FOLLOW PACKAGE DIRECTIONS       oxyCODONE (ROXICODONE) 5 MG tablet Take 5 mg by mouth       oxyCODONE (ROXICODONE) 5 MG tablet        PAIN RELIEVING 4 % external cream APPLY TOPICALLY  ONCE AS NEEDED FOR MILD PAIN       senna-docusate (SENOKOT-S/PERICOLACE) 8.6-50 MG tablet Take 1-4 tablets by mouth       STOOL SOFTENER/LAXATIVE 50-8.6 MG tablet        traZODone (DESYREL) 50 MG tablet Take 1 tablet (50 mg) by mouth nightly as needed for sleep 60 tablet 1     vitamin D2 (ERGOCALCIFEROL) 86099 units (1250 mcg) capsule TAKE 1 CAPSULE BY MOUTH WEEKLY         O: BP (!) 147/89   Pulse 99   Temp 98.1  F (36.7  C)   Resp 18   Wt 102.8 kg (226 lb 9.6 oz)   SpO2 96%   BMI 31.03 kg/m     Gen:  Well nourished and in no acute distress   HEENT: normocephalic atraumatic  Back: walks stiffly and with pain.   Extrem: pain and swelling most over proximal 2nd and 3rd L metatarsal and distal tarsal bones. No induration of the skin. Minimal warmth to palpation. Some pain with ROM of the ankle. Can flex and extend toes. No numbness or tingling.   Psych: Euthymic     This note was created using Dragon dictation system. Typos are not purposeful.

## 2021-12-03 RX ORDER — TERBINAFINE HYDROCHLORIDE 250 MG/1
250 TABLET ORAL DAILY
Qty: 90 TABLET | Refills: 0 | Status: SHIPPED | OUTPATIENT
Start: 2021-12-03 | End: 2022-03-03

## 2021-12-03 NOTE — PATIENT INSTRUCTIONS
12/03/21   [facility] [place]  Phone: [phone]  Fax: [phone]    [facility]  First Floor, [address]  [address] [place] [address]    Faxed referral, demographics, office visit. They will contact pt to schedule.     Elin Kaufman

## 2021-12-14 ENCOUNTER — OFFICE VISIT (OUTPATIENT)
Dept: PODIATRY | Facility: CLINIC | Age: 61
End: 2021-12-14
Payer: COMMERCIAL

## 2021-12-14 VITALS
HEART RATE: 108 BPM | SYSTOLIC BLOOD PRESSURE: 146 MMHG | RESPIRATION RATE: 16 BRPM | DIASTOLIC BLOOD PRESSURE: 70 MMHG | WEIGHT: 226 LBS | TEMPERATURE: 98.8 F | HEIGHT: 73 IN | BODY MASS INDEX: 29.95 KG/M2

## 2021-12-14 DIAGNOSIS — T56.0X1A LEAD-INDUCED ACUTE GOUT OF LEFT FOOT, INITIAL ENCOUNTER: Primary | ICD-10-CM

## 2021-12-14 DIAGNOSIS — M10.172 LEAD-INDUCED ACUTE GOUT OF LEFT FOOT, INITIAL ENCOUNTER: Primary | ICD-10-CM

## 2021-12-14 PROCEDURE — 99243 OFF/OP CNSLTJ NEW/EST LOW 30: CPT | Performed by: PODIATRIST

## 2021-12-14 RX ORDER — TIZANIDINE 2 MG/1
2 TABLET ORAL
COMMUNITY
Start: 2021-11-22 | End: 2022-09-08

## 2021-12-14 RX ORDER — INDOMETHACIN 50 MG/1
50 CAPSULE ORAL
Qty: 15 CAPSULE | Refills: 0 | Status: SHIPPED | OUTPATIENT
Start: 2021-12-14 | End: 2022-04-29

## 2021-12-14 ASSESSMENT — MIFFLIN-ST. JEOR: SCORE: 1894.01

## 2021-12-14 ASSESSMENT — PAIN SCALES - GENERAL: PAINLEVEL: EXTREME PAIN (8)

## 2021-12-14 NOTE — PROGRESS NOTES
FOOT AND ANKLE SURGERY/PODIATRY CONSULT NOTE         ASSESSMENT:   Acute gout left foot      TREATMENT:  The patient was started on indomethacin 50 mg 1 tab 3 times daily.  The patient was placed in a postop shoe today.  The patient is to return to the clinic as needed.        HPI: I was asked to see Ernesto Gomez today complaining of pain on the top of his left foot.  He indicated that he has had this pain for 3 weeks.  In addition to the severe pain he has had some redness and swelling on the top of the left foot.  The pain came with a sudden onset.  He denies trauma to the left foot.  The pain is present even while resting.  He has 2 adjust the sheets so that they do not rest on his left foot to prevent exacerbation of his symptoms.  He denies any other previous treatment.  The patient was seen in consultation at the request of Lynne Maxwell for evaluation and treatment of left foot pain.     Past Medical History:   Diagnosis Date     Alcohol abuse      Cervical disc disease      Cocaine addiction (H)      DJD (degenerative joint disease) 12/13/2006     GERD (gastroesophageal reflux disease)      Tobacco abuse        Social History     Socioeconomic History     Marital status: Single     Spouse name: Not on file     Number of children: Not on file     Years of education: Not on file     Highest education level: Not on file   Occupational History     Not on file   Tobacco Use     Smoking status: Current Every Day Smoker     Smokeless tobacco: Never Used   Substance and Sexual Activity     Alcohol use: Not on file     Drug use: Not on file     Sexual activity: Not on file   Other Topics Concern     Not on file   Social History Narrative     Not on file     Social Determinants of Health     Financial Resource Strain: Not on file   Food Insecurity: Not on file   Transportation Needs: Not on file   Physical Activity: Not on file   Stress: Not on file   Social Connections: Not on file   Intimate Partner Violence:  Not on file   Housing Stability: Not on file        No Known Allergies       Current Outpatient Medications:      tiZANidine (ZANAFLEX) 2 MG tablet, Take 2 mg by mouth, Disp: , Rfl:      acetaminophen (TYLENOL) 500 MG tablet, Take 1-2 tablets (500-1,000 mg) by mouth every 8 hours as needed for mild pain, Disp: 90 tablet, Rfl: 1     diphenhydrAMINE (BENADRYL) 25 MG tablet, Take 1 tablet (25 mg) by mouth every 6 hours as needed for itching or allergies, Disp: 90 tablet, Rfl: 3     DULoxetine (CYMBALTA) 30 MG capsule, Take 1 capsule (30 mg) by mouth daily, Disp: 60 capsule, Rfl: 0     gabapentin (NEURONTIN) 300 MG capsule, Take 1 capsule (300 mg) by mouth 3 times daily Take 1 tablet at bedtime for first 2 days. Then take 1 in morning and 1 in afternoon for 2 days. Then take 3 times day., Disp: 90 capsule, Rfl: 4     HYDROcodone-acetaminophen (NORCO) 5-325 MG tablet, Take 1 tablet by mouth, Disp: , Rfl:      ibuprofen (ADVIL/MOTRIN) 800 MG tablet, Take 1 tablet (800 mg) by mouth every 8 hours as needed for moderate pain, Disp: 90 tablet, Rfl: 1     ketorolac (TORADOL) 10 MG tablet, TAKE 1 TABLET BY MOUTH EVERY 6 HOURS FOR UP TO 3 DAYS AS NEEDED FOR PAIN, Disp: , Rfl:      ketorolac (TORADOL) 10 MG tablet, TAKE 1 TABLET BY MOUTH EVERY 6 HOURS FOR UP TO 3 DAYS AS NEEDED FOR PAIN, Disp: , Rfl:      lidocaine (LMX4) 4 % external cream, Apply topically once as needed for mild pain, Disp: 5 g, Rfl: 3     lidocaine-prilocaine (EMLA) 2.5-2.5 % external cream, Apply topically once for 1 dose, Disp: 5800 g, Rfl: 0     melatonin 3 MG tablet, Take 1-2 tablets (3-6 mg) by mouth nightly as needed for sleep, Disp: 60 tablet, Rfl: 1     methylPREDNISolone (MEDROL DOSEPAK) 4 MG tablet therapy pack, Take by mouth as instructed per packaging., Disp: , Rfl:      methylPREDNISolone (MEDROL DOSEPAK) 4 MG tablet therapy pack, FOLLOW PACKAGE DIRECTIONS, Disp: , Rfl:      oxyCODONE (ROXICODONE) 5 MG tablet, Take 5 mg by mouth, Disp: , Rfl:       oxyCODONE (ROXICODONE) 5 MG tablet, , Disp: , Rfl:      PAIN RELIEVING 4 % external cream, APPLY TOPICALLY ONCE AS NEEDED FOR MILD PAIN, Disp: , Rfl:      senna-docusate (SENOKOT-S/PERICOLACE) 8.6-50 MG tablet, Take 1-4 tablets by mouth, Disp: , Rfl:      STOOL SOFTENER/LAXATIVE 50-8.6 MG tablet, , Disp: , Rfl:      terbinafine (LAMISIL) 250 MG tablet, Take 1 tablet (250 mg) by mouth daily, Disp: 90 tablet, Rfl: 0     traZODone (DESYREL) 50 MG tablet, Take 1 tablet (50 mg) by mouth nightly as needed for sleep, Disp: 60 tablet, Rfl: 1     vitamin D2 (ERGOCALCIFEROL) 66873 units (1250 mcg) capsule, TAKE 1 CAPSULE BY MOUTH WEEKLY, Disp: , Rfl:      No family history on file.     Social History     Socioeconomic History     Marital status: Single     Spouse name: Not on file     Number of children: Not on file     Years of education: Not on file     Highest education level: Not on file   Occupational History     Not on file   Tobacco Use     Smoking status: Current Every Day Smoker     Smokeless tobacco: Never Used   Substance and Sexual Activity     Alcohol use: Not on file     Drug use: Not on file     Sexual activity: Not on file   Other Topics Concern     Not on file   Social History Narrative     Not on file     Social Determinants of Health     Financial Resource Strain: Not on file   Food Insecurity: Not on file   Transportation Needs: Not on file   Physical Activity: Not on file   Stress: Not on file   Social Connections: Not on file   Intimate Partner Violence: Not on file   Housing Stability: Not on file        Review of Systems - Patient denies fever, chills, rash, wound, stiffness, limping, numbness, weakness, heart burn, blood in stool, chest pain with activity, calf pain when walking, shortness of breath with activity, chronic cough, easy bleeding/bruising, swelling of ankles, excessive thirst, fatigue, depression, anxiety.  Patient admits to left foot pain.      OBJECTIVE:  Appearance: alert, well  appearing, and in no distress.    There were no vitals taken for this visit.     There is no height or weight on file to calculate BMI.     General appearance: Patient is alert and fully cooperative with history & exam.  No sign of distress is noted during the visit.  Psychiatric: Affect is pleasant & appropriate.  Patient appears motivated to improve health.  Respiratory: Breathing is regular & unlabored while sitting.  HEENT: Hearing is intact to spoken word.  Speech is clear.  No gross evidence of visual impairment that would impact ambulation.    Vascular: Dorsalis pedis and posterior tibial pulses are palpable. There is pedal hair growth bilaterally.  CFT < 3 sec from anterior tibial surface to distal digits bilaterally. There is no appreciable edema noted.  Dermatologic: Turgor and texture are within normal limits. No coloration or temperature changes. No primary or secondary lesions noted.  Neurologic: All epicritic and proprioceptive sensations are grossly intact bilaterally.  Musculoskeletal: All active and passive ankle, subtalar,  and 1st MPJ range of motion are grossly intact without pain or crepitus, with the exception of midtarsal left foot. Manual muscle strength is within normal limits bilaterally. All dorsiflexors, plantarflexors, invertors, evertors are intact bilaterally. Tenderness present to the dorsal aspect of the left foot on palpation. Tenderness to the dorsal aspect of the left foot with range of motion. Calf is soft/non-tender without warmth/induration    Imaging:       No images are attached to the encounter or orders placed in the encounter.     XR Foot Left G/E 3 Views    Result Date: 12/2/2021  EXAM: XR FOOT LEFT G/E 3 VIEWS LOCATION: [facility] DATE/TIME: 12/2/2021 9:04 AM INDICATION:  Left foot pain COMPARISON: None.     IMPRESSION: Degenerative change first MTP joint. No evidence for fracture. Plantar calcaneal spurring. Achilles calcaneal spurring.     XR  Foot Left G/E 3 Views    Result Date: 12/2/2021  EXAM: XR FOOT LEFT G/E 3 VIEWS LOCATION: [facility] DATE/TIME: 12/2/2021 9:04 AM INDICATION:  Left foot pain COMPARISON: None.     IMPRESSION: Degenerative change first MTP joint. No evidence for fracture. Plantar calcaneal spurring. Achilles calcaneal spurring.         Tino Pressley DPM  [facility] Foot & Ankle Surgery/Podiatry

## 2021-12-14 NOTE — PATIENT INSTRUCTIONS
GOUT  Gout is a disorder that results from the build-up of uric acid in the tissues or a joint. It most often affects the joint of the big toe.  CAUSES  Gout attacks are caused by deposits of crystallized uric acid in the joint. Uric acid is present in the blood and eliminated in the urine, but in people who have gout, uric acid accumulates and crystallizes in the joints. Uric acid is the result of the breakdown of purines, chemicals that are found naturally in our bodies and in food. Some people develop gout because their kidneys have difficulty eliminating normal amounts of uric acid, while others produce too much uric acid.  Gout occurs most commonly in the big toe because uric acid is sensitive to temperature changes. At cooler temperatures, uric acid turns into crystals. Since the toe is the part of the body that is farthest from the heart, it s also the coolest part of the body - and, thus, the most likely target of gout. However, gout can affect any joint in the body.  The tendency to accumulate uric acid is often inherited. Other factors that put a person at risk for developing gout include: high blood pressure, diabetes, obesity, surgery, chemotherapy, stress, and certain medications and vitamins. For example, the body s ability to remove uric acid can be negatively affected by taking aspirin, some diuretic medications ( water pills ), and the vitamin niacin (also called nicotinic acid). While gout is more common in men aged 40 to 60 years, it can occur in younger men as well as in women.  Consuming foods and beverages that contain high levels of purines can trigger an attack of gout. Some foods contain more purines than others and have been associated with an increase of uric acid, which leads to gout. You may be able to reduce your chances of getting a gout attack by limiting or avoiding shellfish, organ meats (kidney, liver, etc.), red wine, beer, and red meat.  Other Triggers include but are not limited  to:  Congestive heart failure, deep vein thrombosis, pneumonia, fasting, dehydration, trauma/surgery, psoriasis.  SYMTOMS  An attack of gout can be miserable, marked by the following symptoms:  Intense pain that comes on suddenly - often in the middle of the night or upon arising   Signs of inflammation such as redness, swelling, and warmth over the joint.   DIAGNOSIS  To diagnose gout, the foot and ankle surgeon will ask questions about your personal and family medical history, followed by an examination of the affected joint. Laboratory tests and x-rays are sometimes ordered to determine if the inflammation is caused by something other than gout.  TREATMENT  Initial treatment of an attack of gout typically includes the following:  Medications. Prescription medications or injections are used to treat the pain, swelling, and inflammation.   Dietary restrictions. Foods and beverages that are high in purines should be avoided, since purines are converted in the body to uric acid.   Fluids. Drink plenty of water and other fluids each day, while also avoiding alcoholic beverages, which cause dehydration. Cherry Juice works well to help decrease pain.  Immobilize and elevate the foot. Avoid standing and walking to give your foot a rest. Also, elevate your foot (level with or slightly above the heart) to help reduce swelling.   The symptoms of gout and the inflammatory process usually resolve in three to ten days with treatment. If gout symptoms continue despite the initial treatment, or if repeated attacks occur, see your primary care physician for maintenance treatment that may involve daily medication. In cases of repeated episodes, the underlying problem must be addressed, as the build-up of uric acid over time can cause arthritic damage to the joint.  DIET CHANGE  A gout diet reduces your intake of foods that are high in purines, which helps control your body's production of uric acid. If you're overweight or obese,  lose weight. However, avoid fasting and rapid weight loss because these can promote a gout attack. Drink plenty of fluids to help flush uric acid from your body. Also avoid high-protein diets, which can cause you to produce too much uric acid (hyperuricemia).   To follow the diet:   Limit meat, poultry and fish. Animal proteins are high in purine. Avoid or severely limit high-purine foods, such as organ meats, herring, anchovies and mackerel. Red meat (beef, pork and lamb), fatty fish and seafood (tuna, shrimp, lobster and scallops) are associated with increased risk of gout. Because all meat, poultry and fish contain purines, limit your intake to 4 to 6 ounces (113 to 170 grams) daily.   Eat more plant-based proteins. You can increase your protein by including more plant-based sources, such as beans and legumes. This switch will also help you cut down on saturated fats, which may indirectly contribute to obesity and gout.   Limit or avoid alcohol. Alcohol interferes with the elimination of uric acid from your body. Drinking beer, in particular, has been linked to gout attacks. If you're having an attack, avoid alcohol. However, when you're not having an attack, drinking one or two 5-ounce (148 milliliter) servings a day of wine is not likely to increase your risk.   Drink plenty of fluids, particularly water. Fluids can help remove uric acid from your body. Aim for eight to 16 8-ounce (237 milliliter) glasses a day.   Choose low-fat or fat-free dairy products. Some studies have shown that drinking skim or low-fat milk and eating foods made with them, such as yogurt, help reduce the risk of gout. Aim for adequate dairy intake of 16 to 24 fluid ounces (473 to 710 milliliters) daily.   Choose complex carbohydrates. Eat more whole grains and fruits and vegetables and fewer refined carbohydrates, such as white bread, cakes and candy.   Limit or avoid sugar. Too many sweets can leave you with no room for plant-based  proteins and low-fat or fat-free dairy products -- the foods you need to avoid gout. Sugary foods also tend to be high in calories, so they make it easier to eat more than you're likely to burn off. Although there's debate about whether sugar has a direct effect on uric acid levels, sweets are definitely linked to overweight and obesity.   There's also some evidence that drinking four to six cups of coffee a day lowers gout risk in men.   RESULTS  Following a gout diet can help you limit your body's uric acid production and increase its elimination. It's not likely to lower the uric acid concentration in your blood enough to treat your gout without medication, but it may help decrease the number of attacks and limit their severity. Following the gout diet and limiting your calories -- particularly if you also add in moderate daily exercise, such as brisk walking -- also can improve your overall health by helping you achieve and maintain a healthy weight.             What is a post surgical shoe?    A post surgical shoe is a medical shoe used to protect the foot and toes after an injury or surgery. It is also called a postop shoe, rigid sole shoe, or hard sole shoe. It looks like on oversized shoe with a flat, hard sole, fabric or mesh sides, and adjustable straps. The shoe is open in the front, where your toes go. The shoe helps change how your foot carries weight. This can help decrease pain and increase movement after an injury or surgery so you can heal.                How do I put on the post surgical shoe?    Sit down and place your foot comfortably in the shoe.    Close the fabric or mesh sides over the top of your foot.    Tighten the straps of the shoe so they are snug but not too tight. The shoe should limit movement but not cut off your blood flow.    Stand up and take a few steps to practice walking.    What else do I need to know?    Check your foot and toes often. Check your foot and toes for redness  and swelling. If your toes are red, swollen, numb, or tingly, loosen your straps. Over time, the swelling from the injury or surgery will decrease. When this happens, you may need to tighten the straps.    Be careful when you walk on wet surfaces. The shoe may be slippery.    Ask about removing the shoe to bathe. Your provider may want you to leave the shoe on when you bathe. Cover it with a plastic bag and tape the bag closed around your leg.  When should I call my doctor?    You have pain or discomfort that does not go away   Driving a Car    You cannot drive while you are wearing a cast or walker boot on the foot that you use to drive. Your surgeon or physiotherapist will tell you when the cast or boot is no longer needed.     Make sure to wear the shoe for the prescribed time or until the doctor tells you to discontinue it s use.

## 2021-12-14 NOTE — LETTER
12/14/2021         RE: Ernesto Gomez  [address]  [place] [address]        Dear Colleague,    Thank you for referring your patient, Ernesto Gomez, to the [facility]. Please see a copy of my visit note below.    FOOT AND ANKLE SURGERY/PODIATRY CONSULT NOTE         ASSESSMENT:   Acute gout left foot      TREATMENT:  The patient was started on indomethacin 50 mg 1 tab 3 times daily.  The patient is to return to the clinic as needed.        HPI: I was asked to see Ernesto Gomez today complaining of pain on the top of his left foot.  He indicated that he has had this pain for 3 weeks.  In addition to the severe pain he has had some redness and swelling on the top of the left foot.  The pain came with a sudden onset.  He denies trauma to the left foot.  The pain is present even while resting.  He has 2 adjust the sheets so that they do not rest on his left foot to prevent exacerbation of his symptoms.  He denies any other previous treatment.  The patient was seen in consultation at the request of Lynne Maxwell for evaluation and treatment of left foot pain.     Past Medical History:   Diagnosis Date     Alcohol abuse      Cervical disc disease      Cocaine addiction (H)      DJD (degenerative joint disease) 12/13/2006     GERD (gastroesophageal reflux disease)      Tobacco abuse        Social History     Socioeconomic History     Marital status: Single     Spouse name: Not on file     Number of children: Not on file     Years of education: Not on file     Highest education level: Not on file   Occupational History     Not on file   Tobacco Use     Smoking status: Current Every Day Smoker     Smokeless tobacco: Never Used   Substance and Sexual Activity     Alcohol use: Not on file     Drug use: Not on file     Sexual activity: Not on file   Other Topics Concern     Not on file   Social History Narrative     Not on file     Social Determinants of Health     Financial  Resource Strain: Not on file   Food Insecurity: Not on file   Transportation Needs: Not on file   Physical Activity: Not on file   Stress: Not on file   Social Connections: Not on file   Intimate Partner Violence: Not on file   Housing Stability: Not on file        No Known Allergies       Current Outpatient Medications:      tiZANidine (ZANAFLEX) 2 MG tablet, Take 2 mg by mouth, Disp: , Rfl:      acetaminophen (TYLENOL) 500 MG tablet, Take 1-2 tablets (500-1,000 mg) by mouth every 8 hours as needed for mild pain, Disp: 90 tablet, Rfl: 1     diphenhydrAMINE (BENADRYL) 25 MG tablet, Take 1 tablet (25 mg) by mouth every 6 hours as needed for itching or allergies, Disp: 90 tablet, Rfl: 3     DULoxetine (CYMBALTA) 30 MG capsule, Take 1 capsule (30 mg) by mouth daily, Disp: 60 capsule, Rfl: 0     gabapentin (NEURONTIN) 300 MG capsule, Take 1 capsule (300 mg) by mouth 3 times daily Take 1 tablet at bedtime for first 2 days. Then take 1 in morning and 1 in afternoon for 2 days. Then take 3 times day., Disp: 90 capsule, Rfl: 4     HYDROcodone-acetaminophen (NORCO) 5-325 MG tablet, Take 1 tablet by mouth, Disp: , Rfl:      ibuprofen (ADVIL/MOTRIN) 800 MG tablet, Take 1 tablet (800 mg) by mouth every 8 hours as needed for moderate pain, Disp: 90 tablet, Rfl: 1     ketorolac (TORADOL) 10 MG tablet, TAKE 1 TABLET BY MOUTH EVERY 6 HOURS FOR UP TO 3 DAYS AS NEEDED FOR PAIN, Disp: , Rfl:      ketorolac (TORADOL) 10 MG tablet, TAKE 1 TABLET BY MOUTH EVERY 6 HOURS FOR UP TO 3 DAYS AS NEEDED FOR PAIN, Disp: , Rfl:      lidocaine (LMX4) 4 % external cream, Apply topically once as needed for mild pain, Disp: 5 g, Rfl: 3     lidocaine-prilocaine (EMLA) 2.5-2.5 % external cream, Apply topically once for 1 dose, Disp: 5800 g, Rfl: 0     melatonin 3 MG tablet, Take 1-2 tablets (3-6 mg) by mouth nightly as needed for sleep, Disp: 60 tablet, Rfl: 1     methylPREDNISolone (MEDROL DOSEPAK) 4 MG tablet therapy pack, Take by mouth as instructed  per packaging., Disp: , Rfl:      methylPREDNISolone (MEDROL DOSEPAK) 4 MG tablet therapy pack, FOLLOW PACKAGE DIRECTIONS, Disp: , Rfl:      oxyCODONE (ROXICODONE) 5 MG tablet, Take 5 mg by mouth, Disp: , Rfl:      oxyCODONE (ROXICODONE) 5 MG tablet, , Disp: , Rfl:      PAIN RELIEVING 4 % external cream, APPLY TOPICALLY ONCE AS NEEDED FOR MILD PAIN, Disp: , Rfl:      senna-docusate (SENOKOT-S/PERICOLACE) 8.6-50 MG tablet, Take 1-4 tablets by mouth, Disp: , Rfl:      STOOL SOFTENER/LAXATIVE 50-8.6 MG tablet, , Disp: , Rfl:      terbinafine (LAMISIL) 250 MG tablet, Take 1 tablet (250 mg) by mouth daily, Disp: 90 tablet, Rfl: 0     traZODone (DESYREL) 50 MG tablet, Take 1 tablet (50 mg) by mouth nightly as needed for sleep, Disp: 60 tablet, Rfl: 1     vitamin D2 (ERGOCALCIFEROL) 99308 units (1250 mcg) capsule, TAKE 1 CAPSULE BY MOUTH WEEKLY, Disp: , Rfl:      No family history on file.     Social History     Socioeconomic History     Marital status: Single     Spouse name: Not on file     Number of children: Not on file     Years of education: Not on file     Highest education level: Not on file   Occupational History     Not on file   Tobacco Use     Smoking status: Current Every Day Smoker     Smokeless tobacco: Never Used   Substance and Sexual Activity     Alcohol use: Not on file     Drug use: Not on file     Sexual activity: Not on file   Other Topics Concern     Not on file   Social History Narrative     Not on file     Social Determinants of Health     Financial Resource Strain: Not on file   Food Insecurity: Not on file   Transportation Needs: Not on file   Physical Activity: Not on file   Stress: Not on file   Social Connections: Not on file   Intimate Partner Violence: Not on file   Housing Stability: Not on file        Review of Systems - Patient denies fever, chills, rash, wound, stiffness, limping, numbness, weakness, heart burn, blood in stool, chest pain with activity, calf pain when walking, shortness  of breath with activity, chronic cough, easy bleeding/bruising, swelling of ankles, excessive thirst, fatigue, depression, anxiety.  Patient admits to left foot pain.      OBJECTIVE:  Appearance: alert, well appearing, and in no distress.    There were no vitals taken for this visit.     There is no height or weight on file to calculate BMI.     General appearance: Patient is alert and fully cooperative with history & exam.  No sign of distress is noted during the visit.  Psychiatric: Affect is pleasant & appropriate.  Patient appears motivated to improve health.  Respiratory: Breathing is regular & unlabored while sitting.  HEENT: Hearing is intact to spoken word.  Speech is clear.  No gross evidence of visual impairment that would impact ambulation.    Vascular: Dorsalis pedis and posterior tibial pulses are palpable. There is pedal hair growth bilaterally.  CFT < 3 sec from anterior tibial surface to distal digits bilaterally. There is no appreciable edema noted.  Dermatologic: Turgor and texture are within normal limits. No coloration or temperature changes. No primary or secondary lesions noted.  Neurologic: All epicritic and proprioceptive sensations are grossly intact bilaterally.  Musculoskeletal: All active and passive ankle, subtalar,  and 1st MPJ range of motion are grossly intact without pain or crepitus, with the exception of midtarsal left foot. Manual muscle strength is within normal limits bilaterally. All dorsiflexors, plantarflexors, invertors, evertors are intact bilaterally. Tenderness present to the dorsal aspect of the left foot on palpation. Tenderness to the dorsal aspect of the left foot with range of motion. Calf is soft/non-tender without warmth/induration    Imaging:       No images are attached to the encounter or orders placed in the encounter.     XR Foot Left G/E 3 Views    Result Date: 12/2/2021  EXAM: XR FOOT LEFT G/E 3 VIEWS LOCATION: [facility] DATE/TIME:  12/2/2021 9:04 AM INDICATION:  Left foot pain COMPARISON: None.     IMPRESSION: Degenerative change first MTP joint. No evidence for fracture. Plantar calcaneal spurring. Achilles calcaneal spurring.     XR Foot Left G/E 3 Views    Result Date: 12/2/2021  EXAM: XR FOOT LEFT G/E 3 VIEWS LOCATION: [facility] DATE/TIME: 12/2/2021 9:04 AM INDICATION:  Left foot pain COMPARISON: None.     IMPRESSION: Degenerative change first MTP joint. No evidence for fracture. Plantar calcaneal spurring. Achilles calcaneal spurring.         Tino Pressley DPM  [facility] Foot & Ankle Surgery/Podiatry         Again, thank you for allowing me to participate in the care of your patient.        Sincerely,        Tino Ortez DPM

## 2021-12-22 DIAGNOSIS — Z23 HIGH PRIORITY FOR 2019-NCOV VACCINE: ICD-10-CM

## 2021-12-22 DIAGNOSIS — F33.1 MODERATE EPISODE OF RECURRENT MAJOR DEPRESSIVE DISORDER (H): ICD-10-CM

## 2021-12-22 DIAGNOSIS — G89.28 OTHER CHRONIC POSTPROCEDURAL PAIN: ICD-10-CM

## 2021-12-22 RX ORDER — DULOXETIN HYDROCHLORIDE 30 MG/1
CAPSULE, DELAYED RELEASE ORAL
Qty: 60 CAPSULE | Refills: 0 | Status: SHIPPED | OUTPATIENT
Start: 2021-12-22 | End: 2022-04-29

## 2021-12-31 ENCOUNTER — OFFICE VISIT (OUTPATIENT)
Dept: FAMILY MEDICINE | Facility: CLINIC | Age: 61
End: 2021-12-31
Payer: COMMERCIAL

## 2021-12-31 VITALS
RESPIRATION RATE: 16 BRPM | HEART RATE: 103 BPM | OXYGEN SATURATION: 94 % | TEMPERATURE: 98.5 F | DIASTOLIC BLOOD PRESSURE: 78 MMHG | SYSTOLIC BLOOD PRESSURE: 129 MMHG

## 2021-12-31 DIAGNOSIS — F10.10 ETOH ABUSE: Primary | ICD-10-CM

## 2021-12-31 DIAGNOSIS — R51.9 CHRONIC NONINTRACTABLE HEADACHE, UNSPECIFIED HEADACHE TYPE: ICD-10-CM

## 2021-12-31 DIAGNOSIS — G89.29 CHRONIC NONINTRACTABLE HEADACHE, UNSPECIFIED HEADACHE TYPE: ICD-10-CM

## 2021-12-31 PROCEDURE — 99213 OFFICE O/P EST LOW 20 MIN: CPT | Mod: GC | Performed by: FAMILY MEDICINE

## 2021-12-31 NOTE — PROGRESS NOTES
"[facility] MEDICINE CLINIC    Assessment/Plan:    ETOH abuse  Chronic nonintractable headache, unspecified headache type  Initially presented to talk about leg pain. While waiting in the exam room for me he did come out and seemed more disoriented, complaining about a headache.  My staff immediately came to grab me from another room to assess the patient.  When I went into the room patient was oriented x3, but did have significant slurring of speech, eyes were glazed over and speech was fairly tangential.  He complained that he had a headache that was a 20 out of 10 that he has had \"forever\" and expressed frustration that doctors have not treated him in the past. He does have a long history of cervical spine arthritis, for which he has been following with spine. He did admit to drinking 2-3 shots of vodka before our visit, and said that he had been drinking vodka last night as well; patient has a history of alcohol abuse.     Given the acute changes, I had initially recommended going to the ED due to his severe headache and neuro findings, (nystagmus, slurred speech) to rule out intracranial pathology with a head CT, although granted most of his symptoms seem to correlate with acute alcohol intoxication.  He did initially agree to go to the emergency department and EMS was called.  I left the room for a minute to alert my attending to the situation.  He then came out of the exam room into the hallway, initially saying that he left his keys in the car and would come back.  He refused to come back into the exam room.  Staff went down again to talk to patient in parking lot and patient said that he was leaving and that he did not want to go to the hospital.  He expressed anger that doctors \"don't care about him\" and \"only want to give him pills\". He walked away on foot down the street, and did stop to wait at light appropriately for traffic before crossing street.    Given patient's current oriented status, " although intoxicated, it was decided that patient was able to make decisions for himself and is not currently holdable.  I do recommend that patient follow-up at some point in the future.      Lynne Maxwell MD  PGY3, Family Medicine    I staffed with Dr. Wetzel, who agrees with my assessment and plan.         Ernesto Gomez is a 61 year old male with a PMH of   Patient Active Problem List   Diagnosis     Cervical spine arthritis with nerve pain     Current moderate episode of major depressive disorder without prior episode (H)     DJD (degenerative joint disease)     Tobacco abuse     GERD (gastroesophageal reflux disease)     ETOH abuse     Cocaine addiction (H)     presenting to clinic today with a chief complaint of:    Per rooming report: Patient presents for  Mass: Pt has a bump next to a bruise on his leg that he would like to discuss today.   Cough: Pt states he believes he either has COPD or Pneumonia but would like an xray to determine this.  States he wishes he would just die already.     As above when I went into the room patient was more concerned with his headache and was angry about how long he has had had pain.  He said that his headache is currently a 20/10. He denied hitting his head in the last few days. When I started asking if he has ever had any issues with bleeding in his brain or brain injuries he became upset and said he saw another doctor that knew his chart by heart. He denies any numbness or tingling.     He did admit to current alcohol use- he says he has had 2-3 shots of vodka so far this morning, and was drinking vodka yesterday as well.       Current Outpatient Medications   Medication Sig Dispense Refill     acetaminophen (TYLENOL) 500 MG tablet Take 1-2 tablets (500-1,000 mg) by mouth every 8 hours as needed for mild pain 90 tablet 1     diphenhydrAMINE (BENADRYL) 25 MG tablet Take 1 tablet (25 mg) by mouth every 6 hours as needed for itching or allergies 90 tablet 3      DULoxetine (CYMBALTA) 30 MG capsule TAKE 1 CAPSULE(30 MG) BY MOUTH DAILY 60 capsule 0     gabapentin (NEURONTIN) 300 MG capsule Take 1 capsule (300 mg) by mouth 3 times daily Take 1 tablet at bedtime for first 2 days. Then take 1 in morning and 1 in afternoon for 2 days. Then take 3 times day. 90 capsule 4     ibuprofen (ADVIL/MOTRIN) 800 MG tablet Take 1 tablet (800 mg) by mouth every 8 hours as needed for moderate pain 90 tablet 1     indomethacin (INDOCIN) 50 MG capsule Take 1 capsule (50 mg) by mouth 3 times daily (with meals) for 5 days 15 capsule 0     lidocaine (LMX4) 4 % external cream Apply topically once as needed for mild pain (Patient not taking: Reported on 12/14/2021) 5 g 3     lidocaine-prilocaine (EMLA) 2.5-2.5 % external cream Apply topically once for 1 dose 5800 g 0     melatonin 3 MG tablet Take 1-2 tablets (3-6 mg) by mouth nightly as needed for sleep (Patient not taking: Reported on 12/14/2021) 60 tablet 1     Misc Natural Products (GLUCOSAMINE CHOND CMP TRIPLE) TABS Take 2 tablets by mouth daily       PAIN RELIEVING 4 % external cream APPLY TOPICALLY ONCE AS NEEDED FOR MILD PAIN       terbinafine (LAMISIL) 250 MG tablet Take 1 tablet (250 mg) by mouth daily (Patient not taking: Reported on 12/14/2021) 90 tablet 0     tiZANidine (ZANAFLEX) 2 MG tablet Take 2 mg by mouth       vitamin D2 (ERGOCALCIFEROL) 43968 units (1250 mcg) capsule TAKE 1 CAPSULE BY MOUTH WEEKLY         O: /78 (BP Location: Left arm, Patient Position: Sitting, Cuff Size: Adult Large)   Pulse 103   Temp 98.5  F (36.9  C) (Oral)   Resp 16   SpO2 94%    Gen:  Agitated, slurring words  Neuro: oriented x4. EOMI with nystagmus bilaterally with lateral gaze. Slightly weaving gait but grossly normal strength.   HEENT: PERRL;  nasopharynx pink and moist; oropharynx pink and moist. Bilateral conjunctival injection.   CV:  RRR  - no murmurs noted   Pulm:  CTAB; breathing well on room air with normal work of  breathing  Psych: Agitated, tangential, often explosive and not redirectable.     This note was created using Dragon dictation system. Typos are not purposeful.

## 2021-12-31 NOTE — PROGRESS NOTES
Preceptor Attestation:  I discussed the patient with the resident and evaluated the patient in person. The patient expressed frustration with continued difficulty with pain and ineffectiveness of medications.  He was awake, alert, and understanding of the situation, but appeared intoxicated, with nystagmus per resident exam.  Plan made with resident and patient to go to ER for further evaluation, given 10/10 headache and nystagmus, but patient then decided to walk out of the clinic and refused further treatment at this time.  He was deemed to be intoxicated but decisional, with normal gait as he left the clinic.  Follow up was recommended.      I have verified the content of the note, which accurately reflects my assessment of the patient and the plan of care.  Supervising Physician:  Katy Wetzel MD.

## 2022-04-29 ENCOUNTER — OFFICE VISIT (OUTPATIENT)
Dept: FAMILY MEDICINE | Facility: CLINIC | Age: 62
End: 2022-04-29
Payer: COMMERCIAL

## 2022-04-29 VITALS
WEIGHT: 242.8 LBS | RESPIRATION RATE: 16 BRPM | BODY MASS INDEX: 32.03 KG/M2 | TEMPERATURE: 97.8 F | DIASTOLIC BLOOD PRESSURE: 112 MMHG | OXYGEN SATURATION: 96 % | SYSTOLIC BLOOD PRESSURE: 159 MMHG | HEART RATE: 101 BPM

## 2022-04-29 DIAGNOSIS — M54.50 LUMBAR BACK PAIN: Primary | ICD-10-CM

## 2022-04-29 DIAGNOSIS — F10.10 ETOH ABUSE: ICD-10-CM

## 2022-04-29 DIAGNOSIS — Z23 HIGH PRIORITY FOR 2019-NCOV VACCINE: ICD-10-CM

## 2022-04-29 DIAGNOSIS — F33.1 MODERATE EPISODE OF RECURRENT MAJOR DEPRESSIVE DISORDER (H): ICD-10-CM

## 2022-04-29 DIAGNOSIS — G89.28 OTHER CHRONIC POSTPROCEDURAL PAIN: ICD-10-CM

## 2022-04-29 DIAGNOSIS — M10.071 ACUTE IDIOPATHIC GOUT OF RIGHT ANKLE: ICD-10-CM

## 2022-04-29 DIAGNOSIS — M47.812 CERVICAL SPINE ARTHRITIS WITH NERVE PAIN: ICD-10-CM

## 2022-04-29 DIAGNOSIS — M25.572 PAIN IN JOINT, ANKLE AND FOOT, LEFT: ICD-10-CM

## 2022-04-29 DIAGNOSIS — M79.2 CERVICAL SPINE ARTHRITIS WITH NERVE PAIN: ICD-10-CM

## 2022-04-29 LAB
ALBUMIN SERPL-MCNC: 4 G/DL (ref 3.5–5)
ALP SERPL-CCNC: 100 U/L (ref 45–120)
ALT SERPL W P-5'-P-CCNC: 56 U/L (ref 0–45)
ANION GAP SERPL CALCULATED.3IONS-SCNC: 14 MMOL/L (ref 5–18)
AST SERPL W P-5'-P-CCNC: 48 U/L (ref 0–40)
BILIRUB SERPL-MCNC: 0.3 MG/DL (ref 0–1)
BUN SERPL-MCNC: 7 MG/DL (ref 8–22)
CALCIUM SERPL-MCNC: 9.2 MG/DL (ref 8.5–10.5)
CHLORIDE BLD-SCNC: 105 MMOL/L (ref 98–107)
CO2 SERPL-SCNC: 22 MMOL/L (ref 22–31)
CREAT SERPL-MCNC: 0.97 MG/DL (ref 0.7–1.3)
GFR SERPL CREATININE-BSD FRML MDRD: 89 ML/MIN/1.73M2
GLUCOSE BLD-MCNC: 113 MG/DL (ref 70–125)
POTASSIUM BLD-SCNC: 3.9 MMOL/L (ref 3.5–5)
PROT SERPL-MCNC: 7.4 G/DL (ref 6–8)
SODIUM SERPL-SCNC: 141 MMOL/L (ref 136–145)
URATE SERPL-MCNC: 10.4 MG/DL (ref 3–8)

## 2022-04-29 PROCEDURE — 36415 COLL VENOUS BLD VENIPUNCTURE: CPT | Performed by: FAMILY MEDICINE

## 2022-04-29 PROCEDURE — 99214 OFFICE O/P EST MOD 30 MIN: CPT | Mod: 25 | Performed by: FAMILY MEDICINE

## 2022-04-29 PROCEDURE — 84550 ASSAY OF BLOOD/URIC ACID: CPT | Performed by: FAMILY MEDICINE

## 2022-04-29 PROCEDURE — 91305 COVID-19,PF,PFIZER (12+ YRS): CPT | Performed by: FAMILY MEDICINE

## 2022-04-29 PROCEDURE — 0054A COVID-19,PF,PFIZER (12+ YRS): CPT | Performed by: FAMILY MEDICINE

## 2022-04-29 PROCEDURE — 80053 COMPREHEN METABOLIC PANEL: CPT | Performed by: FAMILY MEDICINE

## 2022-04-29 RX ORDER — ACETAMINOPHEN 500 MG
500-1000 TABLET ORAL EVERY 8 HOURS PRN
Qty: 90 TABLET | Refills: 1 | Status: SHIPPED | OUTPATIENT
Start: 2022-04-29 | End: 2022-06-15

## 2022-04-29 RX ORDER — GABAPENTIN 300 MG/1
300 CAPSULE ORAL 3 TIMES DAILY
Qty: 90 CAPSULE | Refills: 4 | Status: SHIPPED | OUTPATIENT
Start: 2022-04-29 | End: 2022-04-29

## 2022-04-29 RX ORDER — GABAPENTIN 300 MG/1
300 CAPSULE ORAL 3 TIMES DAILY
Qty: 60 CAPSULE | Refills: 0 | Status: SHIPPED | OUTPATIENT
Start: 2022-04-29 | End: 2022-06-15

## 2022-04-29 RX ORDER — LIDOCAINE 50 MG/G
OINTMENT TOPICAL PRN
Qty: 50 G | Refills: 1 | Status: SHIPPED | OUTPATIENT
Start: 2022-04-29 | End: 2022-10-28

## 2022-04-29 RX ORDER — IBUPROFEN 800 MG/1
800 TABLET, FILM COATED ORAL EVERY 8 HOURS PRN
Qty: 90 TABLET | Refills: 1 | Status: SHIPPED | OUTPATIENT
Start: 2022-04-29 | End: 2022-06-15

## 2022-04-29 RX ORDER — DULOXETIN HYDROCHLORIDE 30 MG/1
CAPSULE, DELAYED RELEASE ORAL
Qty: 60 CAPSULE | Refills: 0 | Status: SHIPPED | OUTPATIENT
Start: 2022-04-29 | End: 2022-05-20

## 2022-04-29 ASSESSMENT — PATIENT HEALTH QUESTIONNAIRE - PHQ9: SUM OF ALL RESPONSES TO PHQ QUESTIONS 1-9: 20

## 2022-04-29 NOTE — PROGRESS NOTES
Preceptor Attestation:  Vitals:    04/29/22 1311 04/29/22 1352   BP: (!) 166/104 (!) 159/112   Pulse: 102 101   Resp: 16    Temp: 97.8  F (36.6  C)    SpO2: 96%    Weight: 110.1 kg (242 lb 12.8 oz)           I discussed the patient with the resident and evaluated the patient in person. I have verified the content of the note, which accurately reflects my assessment of the patient and the plan of care.   Supervising Physician:  Mitzy Cole MD
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
## 2022-05-01 RX ORDER — ALLOPURINOL 100 MG/1
100 TABLET ORAL DAILY
Qty: 30 TABLET | Refills: 1 | Status: SHIPPED | OUTPATIENT
Start: 2022-05-01 | End: 2022-08-03

## 2022-05-04 ENCOUNTER — TELEPHONE (OUTPATIENT)
Dept: CARE COORDINATION | Facility: OTHER | Age: 62
End: 2022-05-04

## 2022-05-04 NOTE — TELEPHONE ENCOUNTER
5/4/2022: Care Coordination     I reached out to Mr. Gomez via phone but was forced to leave a message. I informed him of the very limited appointments that both doctors have via the phone message and suggested the community options in a letter that I sent to the address that we have on file.      My letter to Ernesto.    May 4, 2022      Ernesto Gomez  [address]   [place] [address]        Dear Ernesto,  I am one of the Care Coordinators at [facility]. I have been trying to reach you via phone. However, my attempts have not been successful. Doctors Klarissa are requesting follow up appointments with you. They limited appointments available so interested, please contact out office soon. If there are other resources you are interested in, please do not hesitate to contact me.    If you are interested in scheduling with Community Mental Health Resources please contact one of the options listed below:      [facility] Office  [address]  [place] [address]  [phone] (for appointments)  Fax: [phone]  [facility]  [address]  [place] [address]  [phone]     [facility]  [address]  [place], [place] [address]  [phone]     [facility]  [address]  [place] [address]  [phone]         Sincerely,             J Luis Lujan Sr.  Care Coordinator  [address] [address]  [email or web address]   Office: [phone]  Direct: [phone]  [facility]

## 2022-05-20 ENCOUNTER — OFFICE VISIT (OUTPATIENT)
Dept: FAMILY MEDICINE | Facility: CLINIC | Age: 62
End: 2022-05-20
Payer: COMMERCIAL

## 2022-05-20 VITALS
BODY MASS INDEX: 32.72 KG/M2 | OXYGEN SATURATION: 94 % | RESPIRATION RATE: 16 BRPM | HEART RATE: 101 BPM | SYSTOLIC BLOOD PRESSURE: 167 MMHG | WEIGHT: 248 LBS | DIASTOLIC BLOOD PRESSURE: 97 MMHG | TEMPERATURE: 98 F

## 2022-05-20 DIAGNOSIS — G89.28 OTHER CHRONIC POSTPROCEDURAL PAIN: ICD-10-CM

## 2022-05-20 DIAGNOSIS — F17.200 TOBACCO USE DISORDER: ICD-10-CM

## 2022-05-20 DIAGNOSIS — M79.2 CERVICAL SPINE ARTHRITIS WITH NERVE PAIN: Primary | ICD-10-CM

## 2022-05-20 DIAGNOSIS — F10.20 ALCOHOL USE DISORDER, SEVERE, DEPENDENCE (H): ICD-10-CM

## 2022-05-20 DIAGNOSIS — M47.819 OSTEOARTHRITIS OF SPINE AT MULTIPLE LEVELS: ICD-10-CM

## 2022-05-20 DIAGNOSIS — F33.1 MODERATE EPISODE OF RECURRENT MAJOR DEPRESSIVE DISORDER (H): ICD-10-CM

## 2022-05-20 DIAGNOSIS — R06.09 DOE (DYSPNEA ON EXERTION): ICD-10-CM

## 2022-05-20 DIAGNOSIS — B35.1 ONYCHOMYCOSIS: ICD-10-CM

## 2022-05-20 DIAGNOSIS — M47.812 CERVICAL SPINE ARTHRITIS WITH NERVE PAIN: Primary | ICD-10-CM

## 2022-05-20 DIAGNOSIS — Z23 HIGH PRIORITY FOR 2019-NCOV VACCINE: ICD-10-CM

## 2022-05-20 PROCEDURE — 99214 OFFICE O/P EST MOD 30 MIN: CPT | Mod: GC | Performed by: FAMILY MEDICINE

## 2022-05-20 RX ORDER — CICLOPIROX 80 MG/ML
SOLUTION TOPICAL
Qty: 6 ML | Refills: 1 | Status: SHIPPED | OUTPATIENT
Start: 2022-05-20 | End: 2024-04-22

## 2022-05-20 RX ORDER — DULOXETIN HYDROCHLORIDE 30 MG/1
CAPSULE, DELAYED RELEASE ORAL
Qty: 60 CAPSULE | Refills: 0 | Status: SHIPPED | OUTPATIENT
Start: 2022-05-20 | End: 2022-06-15
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
## 2022-05-20 NOTE — PATIENT INSTRUCTIONS
Depression/mood:   - start taking the cymbalta  - I placed a referral for therapy  - I will talk to our  about potential CADI waiver    2. Spine:   - placed new referral- answer your phone if anyone calls. We will have you meet with the referral coordinator at your next visit if not   - I will place a new PCA order    3. Breathing:   - ordered spirometry (breathing testing) to look for COPD    4. Toenails:   - start the solution I sent to the pharmacy- put it on all the toenails once a week    5. Gout:   - bring all your meds to your next visit

## 2022-05-24 NOTE — LETTER
May 4, 2022      Ernesto Gomez  [address]   [place] [address]        Dear Ernesto,  I am one of the Care Coordinators at [facility]. I have been trying to reach you via phone. However, my attempts have not been successful. Doctors Klarissa are requesting follow up appointments with you. They limited appointments available so interested, please contact out office soon. If there are other resources you are interested in, please do not hesitate to contact me.    If you are interested in scheduling with Community Mental Health Resources please contact one of the options listed below:      [facility]  [address]  [address]  [place] [address]  [phone] (for appointments)  Fax: [phone]  [facility]  [address]  [place] [address]  [phone]     [facility]  [address]  [place], [place] [address]  [phone]     [facility]  [address]  [place] [address]  [phone]             Sincerely,          J Luis Lujan Sr.  Care Coordinator  [address] [address]  [email or web address]   Office: [phone]  Direct: [phone]  [facility]         room air

## 2022-05-25 ENCOUNTER — TELEPHONE (OUTPATIENT)
Dept: FAMILY MEDICINE | Facility: CLINIC | Age: 62
End: 2022-05-25
Payer: COMMERCIAL

## 2022-05-25 NOTE — LETTER
May 25, 2022      Ernesto Gomez  [address]  [place] [address]        Dear Ernesto,  I am one of the Care Coordinators at [facility]. I have been trying to reach you via phone. However, my attempts have not been successful.  How are you doing? I pray that all is going well for you. I am writing you because your Doctor, Dr. Palomo requested that I begin the Cadi waiver process for you. The (CADI) Community Access for Disability Inclusion Waiver provides funding for home and community-based services for children and adults, who would otherwise require the level of care provided in a nursing facility.    I have completed the online intake form on your behalf. Someone from [facility] will be contacting you soon to discuss your medical needs. If you are approved, [place]'s Waiver programs provide services that help people with disabilities live in the community rather than in an institution. These programs are a part of Medical Assistance ([place]).    If you have additional questions, please feel free to contact me!       Sincerely,          J Luis Lujan Sr.  Care Coordinator  [address] [address]  [email or web address]   Office: [phone]  Direct: [phone]  [facility]

## 2022-05-25 NOTE — TELEPHONE ENCOUNTER
5/25/22: Care Coordination     Dr. Palomo requested that Mr. Gomez be considered for waivered services. I have completed the online [place] Choices [place] Int[place] form and notified Mr. Gomez of the process by phone and by mail.        Here is the letter that I sent Mr. Gomez.     May 25, 2022      Ernesto HACKETT Patricia  [address]  [place] [address]        Dear Ernesto,  I am one of the Care Coordinators at [facility]. I have been trying to reach you via phone. However, my attempts have not been successful.    How are you doing? I pray that all is going well for you. I am writing you because your Doctor, Dr. Palomo requested that I begin the Cadi waiver process for you. The (CADI) Community Access for Disability Inclusion Waiver provides funding for home and community-based services for children and adults, who would otherwise require the level of care provided in a nursing facility.      I have completed the online intake form on your behalf. Someone from [facility] will be contacting you soon to discuss your medical needs. If you are approved, [place]'s Waiver programs provide services that help people with disabilities live in the community rather than in an institution. These programs are a part of Medical Assistance (MA).    If you have additional questions, please feel free to contact me!       Sincerely,          J Luis Lujan Sr.  Care Coordinator  [address] [address]  [email or web address]   Office: [phone]  Direct: [phone]  [facility]

## 2022-05-27 NOTE — PROGRESS NOTES
Preceptor Attestation:  Vitals:    05/20/22 0950 05/20/22 0952   BP: (!) 178/111 (!) 167/97   BP Location: Left arm Left arm   Patient Position: Sitting Sitting   Cuff Size: Adult Large Adult Large   Pulse: 101    Resp: 16    Temp: 98  F (36.7  C)    TempSrc: Oral    SpO2: 94%    Weight: 112.5 kg (248 lb)           I discussed the patient with the resident and evaluated the patient in person. I have verified the content of the note, which accurately reflects my assessment of the patient and the plan of care.   Supervising Physician:  Mitzy Cole MD

## 2022-06-14 DIAGNOSIS — Z23 HIGH PRIORITY FOR 2019-NCOV VACCINE: ICD-10-CM

## 2022-06-14 DIAGNOSIS — G89.28 OTHER CHRONIC POSTPROCEDURAL PAIN: ICD-10-CM

## 2022-06-14 RX ORDER — GABAPENTIN 300 MG/1
300 CAPSULE ORAL 3 TIMES DAILY
Qty: 60 CAPSULE | Refills: 0 | Status: CANCELLED | OUTPATIENT
Start: 2022-06-14

## 2022-06-15 ENCOUNTER — OFFICE VISIT (OUTPATIENT)
Dept: FAMILY MEDICINE | Facility: CLINIC | Age: 62
End: 2022-06-15
Payer: MEDICARE

## 2022-06-15 VITALS
BODY MASS INDEX: 32.46 KG/M2 | HEART RATE: 91 BPM | DIASTOLIC BLOOD PRESSURE: 121 MMHG | OXYGEN SATURATION: 98 % | TEMPERATURE: 97.6 F | RESPIRATION RATE: 20 BRPM | SYSTOLIC BLOOD PRESSURE: 180 MMHG | WEIGHT: 246 LBS

## 2022-06-15 DIAGNOSIS — M47.812 CERVICAL SPINE ARTHRITIS WITH NERVE PAIN: ICD-10-CM

## 2022-06-15 DIAGNOSIS — I10 BENIGN ESSENTIAL HYPERTENSION: Primary | ICD-10-CM

## 2022-06-15 DIAGNOSIS — M47.816 ARTHRITIS OF LUMBAR SPINE: ICD-10-CM

## 2022-06-15 DIAGNOSIS — M10.071 ACUTE IDIOPATHIC GOUT OF RIGHT ANKLE: ICD-10-CM

## 2022-06-15 DIAGNOSIS — R06.09 DOE (DYSPNEA ON EXERTION): ICD-10-CM

## 2022-06-15 DIAGNOSIS — M79.2 CERVICAL SPINE ARTHRITIS WITH NERVE PAIN: ICD-10-CM

## 2022-06-15 DIAGNOSIS — J44.9 CHRONIC OBSTRUCTIVE PULMONARY DISEASE, UNSPECIFIED COPD TYPE (H): ICD-10-CM

## 2022-06-15 DIAGNOSIS — Z72.0 TOBACCO ABUSE: ICD-10-CM

## 2022-06-15 DIAGNOSIS — F33.1 MODERATE EPISODE OF RECURRENT MAJOR DEPRESSIVE DISORDER (H): ICD-10-CM

## 2022-06-15 DIAGNOSIS — F10.20 ALCOHOL USE DISORDER, SEVERE, DEPENDENCE (H): ICD-10-CM

## 2022-06-15 PROCEDURE — 99214 OFFICE O/P EST MOD 30 MIN: CPT | Mod: GC | Performed by: FAMILY MEDICINE

## 2022-06-15 RX ORDER — AMLODIPINE BESYLATE 10 MG/1
10 TABLET ORAL DAILY
Qty: 90 TABLET | Refills: 1 | Status: SHIPPED | OUTPATIENT
Start: 2022-06-15 | End: 2022-10-28

## 2022-06-15 RX ORDER — ACETAMINOPHEN 500 MG
500-1000 TABLET ORAL EVERY 8 HOURS PRN
Qty: 90 TABLET | Refills: 1 | Status: SHIPPED | OUTPATIENT
Start: 2022-06-15 | End: 2022-08-03

## 2022-06-15 RX ORDER — CHLORTHALIDONE 25 MG/1
25 TABLET ORAL DAILY
Qty: 30 TABLET | Refills: 1 | Status: SHIPPED | OUTPATIENT
Start: 2022-06-15 | End: 2022-06-15

## 2022-06-15 RX ORDER — GABAPENTIN 300 MG/1
CAPSULE ORAL
Qty: 210 CAPSULE | Refills: 3 | Status: SHIPPED | OUTPATIENT
Start: 2022-06-15 | End: 2023-02-10

## 2022-06-15 RX ORDER — DULOXETIN HYDROCHLORIDE 60 MG/1
CAPSULE, DELAYED RELEASE ORAL
Qty: 60 CAPSULE | Refills: 3 | Status: SHIPPED | OUTPATIENT
Start: 2022-06-15 | End: 2022-10-28

## 2022-06-15 RX ORDER — IBUPROFEN 800 MG/1
800 TABLET, FILM COATED ORAL EVERY 8 HOURS PRN
Qty: 90 TABLET | Refills: 1 | Status: SHIPPED | OUTPATIENT
Start: 2022-06-15 | End: 2023-02-10

## 2022-06-15 ASSESSMENT — PATIENT HEALTH QUESTIONNAIRE - PHQ9: SUM OF ALL RESPONSES TO PHQ QUESTIONS 1-9: 20

## 2022-06-15 NOTE — PROGRESS NOTES
[facility]    Assessment/Plan:    Moderate episode of recurrent major depressive disorder (H)  Continues, exacerbated by pain. No longer experiencing homelessness, is currently in an apartment which has been helping.  Also has good relationships with friends and mother is living in a nursing home in [place].  Has been interested in doing therapy in the past but difficult to get a hold of by phone; had our referral coordinator visit with him today to help facilitate this.  Also going up on Cymbalta dose from 30 mg to 60 mg  - DULoxetine (CYMBALTA) 60 MG capsule  Dispense: 60 capsule; Refill: 3    Cervical spine arthritis with nerve pain  Arthritis of lumbar spine  Alcohol use disorder, severe, dependence (H)  Long history of chronic pain due to nerve compression from cervical and lumbar spine arthritis.  Has terrible headaches, thought to be due to cervical nerve compression; had consider doing nerve ablations in the past but was lost to follow-up with pain/palliative.  He had a cervical spine fusion with [facility] about 1 year ago; he since has had difficulties with dysphagia and does not want to go back to [facility] due to this.  He also is having lumbar spine pain and said he had steroid injections a long time ago that helped significantly with relieving pain for 10 years.  He is hopeful that he might be able to have steroid injections in the lumbar area at some point in the future.    Increasing Cymbalta dose and gabapentin dose to 600 mg in the morning and afternoon and 1200 mg at nighttime.  Using alcohol to cope with pain and depression as above.  Would be poor candidate for chronic opioid therapy due to this.  Patient has filled out intake packet for [facility] spine surgery but does not scheduled appointment; was seen by referral coordinator today who helps with facilitating this.  - DULoxetine (CYMBALTA) 60 MG capsule  Dispense: 60 capsule; Refill: 3  - gabapentin  (NEURONTIN) 300 MG capsule  Dispense: 210 capsule; Refill: 3  - acetaminophen (TYLENOL) 500 MG tablet  Dispense: 90 tablet; Refill: 1  - ibuprofen (ADVIL/MOTRIN) 800 MG tablet  Dispense: 90 tablet; Refill: 1    Benign essential hypertension  Should not be on thiazide diuretics due to gout, will avoid ACE inhibitor as already having a little bit of a chronic dry cough and the cough is very painful for his neck pain.  - amLODIPine (NORVASC) 10 MG tablet  Dispense: 90 tablet; Refill: 1    LYON (dyspnea on exertion)  Tobacco abuse  Chronic obstructive pulmonary disease, unspecified COPD type (H)  Discussed with patient that I cannot rule out anginal equivalents but given history sounds more like COPD, especially given significant smoking history.  We will do spirometry and if this is negative would recommend checking on heart with stress test.  Patient had questions about COPD as he had a friend who  of it, discussed etiology of disease and strong recommendation to stop smoking.  - Spirometry, Breathing Capacity  - Pulmonary Function Test    Acute idiopathic gout of right ankle  Further discuss today; had not picked up allopurinol from pharmacy and recommended, uric acid levels 10 last checked.      Lynne Maxwell MD  PGY3, Family Medicine    I staffed with Dr. Cabrera, who agrees with my assessment and plan.    Diagnosis or treatment significantly limited by social determinants of health - financial instability, alcohol use disorder severe, chronic pain and depression impacting ability for patient to coordinate his care  Ordering of each unique test  Prescription drug management       Ernesto Gomez is a 61 year old male with a PMH of   Patient Active Problem List   Diagnosis     Cervical spine arthritis with nerve pain     Current moderate episode of major depressive disorder without prior episode (H)     DJD (degenerative joint disease)     Tobacco abuse     GERD (gastroesophageal reflux disease)      ETOH abuse     Cocaine addiction (H)     COPD (chronic obstructive pulmonary disease) (H)     presenting to clinic today with a chief complaint of:    Patient presents with:  Depression  Recheck Medication      The headache has been bad- at a 7.  Will get up to a 10 with movements or with coughing/sneezing.  Has been trying to rub at the muscles which sometimes helps but very minimally.  Has been trying to do his own range of motion stretches, which is painful but he is trying to do.  He has not seen any physicians or had any physical therapy since I last saw him.  He also has a fair amount of lower back pain which makes it difficult to walk.  Having a hard time with focusing on the paperwork he has to fill out for appointments due to the pain.  Has lost track of who is calling in and what appointments he needs to make.  He is very frustrated by his decreased ability to play with friends of the family kids, who he views as like his own grandchildren.  Becomes tearful when talking about this.      Medications were brought with him today and reviewed them; he has been doing 1 pill of gabapentin in the morning, 1 in the afternoon and 2 at night.  Sometimes he will have 3 pills of the gabapentin at nighttime because of the severe pain.  He has been doing 30 mg of daily Cymbalta.  He takes ibuprofen and Tylenol as prescribed throughout the day.  He does not have allopurinol with him and has not picked up and had difficulty getting the topical antifungal I prescribed last time for toenails last visit.    Throat had been getting worse swallowing wise.  He was told that the scar tissue might be there for the next year before it heals but he is feeling frustrated and feeling like it is getting worse.  Sometimes he feels like it is hard to swallow his pills and like food is getting stuck on the way down.    He currently does not have a  or anyone who is helping him with coordination of care.  Had [employer] helping  with housing, getting loan forgiveness but he lost this  and they moved.  He is no longer homeless and is now living at an apartment that the [facility] helped him get- paying for it by himself. $900 a month, which is not ideal financially but he is managing.    Continues to get shortness of breath, mostly when walking or when lying down to watch TV.  He is at a 45 degree angle in the recliner and the shortness of breath is not worsened when he is lying flat, or when lying down in bed.  No chest pain.  No known history of heart disease.  He continues to smoke.  She has a friend who  at about his age of COPD on  and he is worried about this.    Current Outpatient Medications   Medication Sig Dispense Refill     acetaminophen (TYLENOL) 500 MG tablet Take 1-2 tablets (500-1,000 mg) by mouth every 8 hours as needed for mild pain 90 tablet 1     amLODIPine (NORVASC) 10 MG tablet Take 1 tablet (10 mg) by mouth daily 90 tablet 1     DULoxetine (CYMBALTA) 60 MG capsule TAKE 1 CAPSULE(30 MG) BY MOUTH DAILY 60 capsule 3     gabapentin (NEURONTIN) 300 MG capsule Take two tablets in the AM (600 mg), two tablets in the afternoon (600 mg), three tablets (900) in the nighttime 210 capsule 3     ibuprofen (ADVIL/MOTRIN) 800 MG tablet Take 1 tablet (800 mg) by mouth every 8 hours as needed for moderate pain 90 tablet 1     allopurinol (ZYLOPRIM) 100 MG tablet Take 1 tablet (100 mg) by mouth daily 30 tablet 1     ciclopirox (PENLAC) 8 % external solution Apply to adjacent skin and affected nails daily.  Remove with alcohol every 7 days, then repeat. 6 mL 1     diphenhydrAMINE (BENADRYL) 25 MG tablet Take 1 tablet (25 mg) by mouth every 6 hours as needed for itching or allergies (Patient not taking: No sig reported) 90 tablet 3     lidocaine (XYLOCAINE) 5 % external ointment Apply topically as needed for moderate pain 50 g 1     lidocaine-prilocaine (EMLA) 2.5-2.5 % external cream Apply topically once for 1 dose  5800 g 0     melatonin 3 MG tablet Take 1-2 tablets (3-6 mg) by mouth nightly as needed for sleep (Patient not taking: No sig reported) 60 tablet 1     Misc Natural Products (GLUCOSAMINE CHOND CMP TRIPLE) TABS Take 2 tablets by mouth daily (Patient not taking: No sig reported)       PAIN RELIEVING 4 % external cream APPLY TOPICALLY ONCE AS NEEDED FOR MILD PAIN (Patient not taking: No sig reported)       tiZANidine (ZANAFLEX) 2 MG tablet Take 2 mg by mouth (Patient not taking: No sig reported)       vitamin D2 (ERGOCALCIFEROL) 63623 units (1250 mcg) capsule TAKE 1 CAPSULE BY MOUTH WEEKLY (Patient not taking: No sig reported)         O: BP (!) 180/121   Pulse 91   Temp 97.6  F (36.4  C) (Oral)   Resp 20   Wt 111.6 kg (246 lb)   SpO2 98%   BMI 32.46 kg/m     Gen:  Well nourished and in no acute distress   HEENT: PERRL;  nasopharynx pink and moist; oropharynx pink and moist  Neck: Decreased range of motion of forward flexion and extension due to pain  Pulm:  CTAB, no wheezes or crackles noted, good air entry   Extrem: no cyanosis, edema or clubbing   neuro: Normal balance, walks slowly but fairly normal gait  Psych: Euthymic, in good spirits today and telling jokes    This note was created using Dragon dictation system. Typos are not purposeful.

## 2022-06-15 NOTE — PROGRESS NOTES
Preceptor Attestation:    I discussed the patient with the resident and evaluated the patient in person. I have verified the content of the note, which accurately reflects my assessment of the patient and the plan of care.   Supervising Physician:  Miguelangel Cabrera DO.

## 2022-06-15 NOTE — COMMUNITY RESOURCES LIST (ENGLISH)
06/15/2022   [facility] - Outpatient Clinics  Jose Benitez  For questions about this resource list or additional care needs, please contact your primary care clinic or care manager.  Phone: [phone]   Email: N/A   Address: [address] [place] [address]   Hours: N/A        Hotlines and Helplines       Hotline - Crisis help  1  [facility] - Adult Mental Health Urgent Care Distance: 0.93 miles      COVID-19 Status: Phone/Virtual   [address] [place] [address]  Language: English, Hmong, Canadian  Hours: Mon - Sun Open 24 Hours   Phone: (301) 657-4897 Website: [email or web address]./[facility]/health-medical/clinics-services/mental-behavorial-health/adult-mental-health-chemical-health/urgent-care-adult-mental-health     2  [place] Department of [place] - Crisis Text Line - Crisis Text Line Distance: 1 miles      COVID-19 Status: Phone/Virtual   [address] [place] [address]  Language: English  Hours: Mon - Sun Open 24 Hours   Website: https://[place].gov/dhs/partners-and-providers/policies-procedures/adult-mental-health/crisis-text-line/          Mental Health       Individual counseling  3  [facility] Distance: 0.13 miles      COVID-19 Status: Phone/Virtual   [address] [place] [address]  Language: English  Hours: Mon 10:30 AM - 5:00 PM , Tue - Fri 8:30 AM - 5:00 PM  Fees: Free   Phone: (696) 286-1590 Email: info@[employer].net Website: http://www.[employer].net     4  [facility] Distance: 0.48 miles      COVID-19 Status: Phone/Virtual   [address] [place] [address]  Language: English, Marshallese, Canadian  Hours: Mon - Fri 8:30 AM - 5:00 PM  Fees: Insurance, Self Pay   Phone: (509) 904-8336 Email: info@[employer].org Website: http://www.[employer].org     Mental health crisis care  5  [facility] Mental Health Urgent Care - Adult Program Distance: 0.93 miles      COVID-19 Status: Regular Operations   [address]  [place] [address]  Language: English, Hmong, Tajik  Hours: Mon - Fri 8:00 AM - 5:30 PM  Fees: Insurance, Self Pay, Sliding Fee   Phone: (317) 223-2038 Website: [email or web address]://www.[facility]./residents/health-medical/clinics-services/mental-behavorial-health/adult-mental-health-chemical-health/urgent-care-adult-mental-health     6  [employer] - Crisis Stabilization Services ([facility]) Distance: 5.45 miles      COVID-19 Status: Regular Operations   [address]  [place] [address]  Language: English, Indian  Hours: Mon - Sun Open 24 Hours  Fees: Insurance   Phone: (754) 792-2926 Email: info@[employer].org Website: https://[employer].org/services-programs/residential-services/[facility]/     Mental health support group  7  [facility] Distance: 1.62 miles      COVID-19 Status: Phone/Virtual   [address] [place] [address]  Language: English  Hours: Mon - Fri 8:00 AM - 4:30 PM  Fees: Free   Phone: (570) 678-9306 Website: https://www.[place].gov/find-locations/facility/vc_0416V     8  [employer] [facility] Distance: 4.51 miles      COVID-19 Status: Regular Operations, COVID-19 Status: Phone/Virtual   [address] [place] [address]  Language: English  Hours: Mon - Thu 12:00 PM - 5:00 PM  Fees: Free   Phone: (749) 888-5352 Email: info@[employer].org Website: http://[employer].org/          Important Numbers & Websites       Emergency Services   911  City Services   311  Poison Control   (748) 778-2934  Suicide Prevention Lifeline   (481) 197-6137 (TALK)  Child Abuse Hotline   (939) 174-8962 (4-A-Child)  Sexual Assault Hotline   (194) 957-6140 (HOPE)  National Runaway Safeline   (904) 270-9023 (RUNAWAY)  All-Options Talkline   (242) 247-9032  Substance Abuse Referral   (172) 133-6394 (HELP)

## 2022-06-15 NOTE — PATIENT INSTRUCTIONS
Pain:  Increasing the gabapentin and cymbalta dose  Getting you in with spine    Shortness of breath:  Getting lung testing to see if it's COPD-- stopping smoking will help  If it is COPD we can talk more about starting inhalers which will help, if it's not COPD we should check on your heart vessels    Gout: start the allopurinol which was sent to the pharmacy    Toenails: the solution to put on the nails is ready for you at the pharmacy

## 2022-06-22 ENCOUNTER — HOSPITAL ENCOUNTER (OUTPATIENT)
Dept: RESPIRATORY THERAPY | Facility: CLINIC | Age: 62
Discharge: HOME | End: 2022-06-22
Admitting: FAMILY MEDICINE
Payer: MEDICARE

## 2022-06-22 DIAGNOSIS — R06.09 DOE (DYSPNEA ON EXERTION): ICD-10-CM

## 2022-06-22 DIAGNOSIS — J44.9 CHRONIC OBSTRUCTIVE PULMONARY DISEASE, UNSPECIFIED COPD TYPE (H): ICD-10-CM

## 2022-06-22 LAB
DLCOCOR-%PRED-PRE: 81 %
DLCOCOR-PRE: 24.61 ML/MIN/MMHG
DLCOUNC-%PRED-PRE: 83 %
DLCOUNC-PRE: 25.14 ML/MIN/MMHG
DLCOUNC-PRED: 30.24 ML/MIN/MMHG
ERV-%PRED-PRE: 21 %
ERV-PRE: 0.25 L
ERV-PRED: 1.16 L
EXPTIME-PRE: 6.76 SEC
FEF2575-%PRED-POST: 109 %
FEF2575-%PRED-PRE: 109 %
FEF2575-POST: 3.51 L/SEC
FEF2575-PRE: 3.52 L/SEC
FEF2575-PRED: 3.21 L/SEC
FEFMAX-%PRED-PRE: 71 %
FEFMAX-PRE: 7.04 L/SEC
FEFMAX-PRED: 9.91 L/SEC
FEV1-%PRED-PRE: 84 %
FEV1-PRE: 3.31 L
FEV1FEV6-PRE: 81 %
FEV1FEV6-PRED: 79 %
FEV1FVC-PRE: 81 %
FEV1FVC-PRED: 77 %
FEV1SVC-PRE: 81 %
FEV1SVC-PRED: 71 %
FIFMAX-PRE: 7.9 L/SEC
FRCPLETH-%PRED-PRE: 73 %
FRCPLETH-PRE: 2.79 L
FRCPLETH-PRED: 3.79 L
FVC-%PRED-PRE: 79 %
FVC-PRE: 4.08 L
FVC-PRED: 5.14 L
HGB BLD-MCNC: 15.4 G/DL (ref 13.3–17.7)
IC-%PRED-PRE: 87 %
IC-PRE: 3.8 L
IC-PRED: 4.36 L
RVPLETH-%PRED-PRE: 97 %
RVPLETH-PRE: 2.45 L
RVPLETH-PRED: 2.52 L
TLCPLETH-%PRED-PRE: 84 %
TLCPLETH-PRE: 6.54 L
TLCPLETH-PRED: 7.74 L
VA-%PRED-PRE: 85 %
VA-PRE: 6.18 L
VC-%PRED-PRE: 74 %
VC-PRE: 4.09 L
VC-PRED: 5.52 L

## 2022-06-22 PROCEDURE — 94729 DIFFUSING CAPACITY: CPT

## 2022-06-22 PROCEDURE — 999N000157 HC STATISTIC RCP TIME EA 10 MIN

## 2022-06-22 PROCEDURE — 36415 COLL VENOUS BLD VENIPUNCTURE: CPT | Performed by: FAMILY MEDICINE

## 2022-06-22 PROCEDURE — 94726 PLETHYSMOGRAPHY LUNG VOLUMES: CPT | Mod: 26 | Performed by: INTERNAL MEDICINE

## 2022-06-22 PROCEDURE — 94729 DIFFUSING CAPACITY: CPT | Mod: 26 | Performed by: INTERNAL MEDICINE

## 2022-06-22 PROCEDURE — 85018 HEMOGLOBIN: CPT | Performed by: FAMILY MEDICINE

## 2022-06-22 PROCEDURE — 94060 EVALUATION OF WHEEZING: CPT

## 2022-06-22 PROCEDURE — 94726 PLETHYSMOGRAPHY LUNG VOLUMES: CPT

## 2022-06-22 PROCEDURE — 94060 EVALUATION OF WHEEZING: CPT | Mod: 26 | Performed by: INTERNAL MEDICINE

## 2022-06-22 PROCEDURE — 250N000009 HC RX 250: Performed by: FAMILY MEDICINE

## 2022-06-22 RX ORDER — ALBUTEROL SULFATE 0.83 MG/ML
2.5 SOLUTION RESPIRATORY (INHALATION) ONCE
Status: COMPLETED | OUTPATIENT
Start: 2022-06-22 | End: 2022-06-22

## 2022-06-22 RX ADMIN — ALBUTEROL SULFATE 2.5 MG: 2.5 SOLUTION RESPIRATORY (INHALATION) at 07:39

## 2022-06-24 ENCOUNTER — TELEPHONE (OUTPATIENT)
Dept: FAMILY MEDICINE | Facility: CLINIC | Age: 62
End: 2022-06-24

## 2022-06-24 DIAGNOSIS — R06.09 DYSPNEA ON EXERTION: Primary | ICD-10-CM

## 2022-06-24 NOTE — TELEPHONE ENCOUNTER
Called patient to discuss normal PFTs. Continuing to have LYON; should rule out cardiac origin given no evidence of COPD.    Patient is also asking about people call him- he's not sure if he missed a call from our referral coordinator.  He also is pretty sure he missed the call from the [facility] people about CADI waiver assessment.    Lynne Maxwell MD MD PGY3  [facility]

## 2022-08-03 ENCOUNTER — OFFICE VISIT (OUTPATIENT)
Dept: FAMILY MEDICINE | Facility: CLINIC | Age: 62
End: 2022-08-03
Payer: MEDICARE

## 2022-08-03 VITALS
SYSTOLIC BLOOD PRESSURE: 127 MMHG | RESPIRATION RATE: 20 BRPM | TEMPERATURE: 97.8 F | OXYGEN SATURATION: 93 % | HEART RATE: 112 BPM | WEIGHT: 250.8 LBS | DIASTOLIC BLOOD PRESSURE: 76 MMHG | BODY MASS INDEX: 33.09 KG/M2

## 2022-08-03 DIAGNOSIS — R19.7 DIARRHEA, UNSPECIFIED TYPE: ICD-10-CM

## 2022-08-03 DIAGNOSIS — J30.2 SEASONAL ALLERGIC RHINITIS DUE TO FUNGAL SPORES: ICD-10-CM

## 2022-08-03 DIAGNOSIS — M79.2 CERVICAL SPINE ARTHRITIS WITH NERVE PAIN: Primary | ICD-10-CM

## 2022-08-03 DIAGNOSIS — M47.812 CERVICAL SPINE ARTHRITIS WITH NERVE PAIN: Primary | ICD-10-CM

## 2022-08-03 DIAGNOSIS — M10.071 ACUTE IDIOPATHIC GOUT OF RIGHT ANKLE: ICD-10-CM

## 2022-08-03 PROCEDURE — 99214 OFFICE O/P EST MOD 30 MIN: CPT | Mod: GC

## 2022-08-03 RX ORDER — ALLOPURINOL 100 MG/1
100 TABLET ORAL DAILY
Qty: 30 TABLET | Refills: 1 | Status: SHIPPED | OUTPATIENT
Start: 2022-08-03 | End: 2023-02-10

## 2022-08-03 RX ORDER — ACETAMINOPHEN 500 MG
500-1000 TABLET ORAL EVERY 8 HOURS PRN
Qty: 90 TABLET | Refills: 1 | Status: SHIPPED | OUTPATIENT
Start: 2022-08-03 | End: 2022-10-28

## 2022-08-03 RX ORDER — BISMUTH SUBSALICYLATE 262 MG/1
1 TABLET, CHEWABLE ORAL 4 TIMES DAILY PRN
Qty: 90 TABLET | Refills: 0 | Status: SHIPPED | OUTPATIENT
Start: 2022-08-03 | End: 2024-04-22

## 2022-08-03 RX ORDER — DIPHENHYDRAMINE HCL 25 MG
25 TABLET ORAL EVERY 6 HOURS PRN
Qty: 90 TABLET | Refills: 3 | Status: SHIPPED | OUTPATIENT
Start: 2022-08-03 | End: 2022-10-28

## 2022-08-03 NOTE — PATIENT INSTRUCTIONS
Thank you for coming to see me today in clinic!    Today we discussed:  - Your pain control.  Please continue to take your Cymbalta 60 mg in the morning and 60 mg at bedtime.  Please continue to take your gabapentin 600 mg in the morning, 600 mg in the afternoon, and 900 mg at bedtime.  If you are interested in following up with the chronic pain clinic again, please let me know and I can place a referral.  - I will have you meet with our referral coordinator and have you schedule your appointments today.  - To help control your gout your allopurinol has been refilled.  To further help control this, please try to cut out alcohol, red meats, and smoking.  We are here to help you whenever you are ready to quit.    If you have any further questions, please call the clinic!    Have a wonderful rest of your day!

## 2022-08-03 NOTE — PROGRESS NOTES
"[facility] Medicine Clinic Visit    Assessment & Plan   Ernesto Gomez is a 61 year old male with the past medical history of cervical spine arthritis and degenerative joint disease with nerve pain s/p surgery, gout, seasonal allergies, tobacco use, cocaine addiction, ETOH abuse, and depression. He presents to [facility] for medication refills and follow-up for pain.    Cervical spine arthritis with nerve pain  Medications were gone over and explained on how to be taken (see below). He states he is still having pain. He has gone to seen the chronic pain clinic, but the patient states he does not want to go back because \"they want to burn all my nerves to try to help the pain.\"  - acetaminophen (TYLENOL) 500 MG tablet; Take 1-2 tablets (500-1,000 mg) by mouth every 8 hours as needed for mild pain  - Educated to take gabapentin 300mg tablets with 2 tablets in the morning, 2 tablets in the afternoon, and 3 tablets at bedtime  - Educated to take Cymbalta 60 mg in the morning and 60 mg in the evening for a total of 120 mg daily    Acute idiopathic gout of right ankle  Three months ago, the patient's uric acid level was 10.4. He has been taking allopurinol every day until he had no more pills. He states he believes it is working. He was educated on cutting down on red meat, smoking, and alcohol to help gout flares. Patient states he is not prepared to do these things at this time.  - allopurinol (ZYLOPRIM) 100 MG tablet; Take 1 tablet (100 mg) by mouth daily  - Patient also has one more refill for 30 days he can call into the pharmacy    Seasonal allergic rhinitis due to fungal spores  Patient states he is out of this medication. He only takes it as needed for allergy symptoms and tries to wait to the evening because it makes him tired.  - diphenhydrAMINE (BENADRYL) 25 MG tablet; Take 1 tablet (25 mg) by mouth every 6 hours as needed for itching or allergies    Diarrhea, unspecified type  Patient states he has " at least one diarrhea a day. He would like to continue taking Pepto Bismol chewables when needed. He was buying them over the counter and would like to have a prescription.  - bismuth subsalicylate (PEPTO BISMOL) 262 MG chewable tablet; Take 1 tablet (262 mg) by mouth 4 times daily as needed for diarrhea    Return in about 3 months (around 11/3/2022) for gout, pain, and SOB.        Patient was staffed with supervising physician, Dr. Miguelangel Cabrera.    Obdulia Salinas MD, PGY2  [facility] Family Medicine      Subjective   Ernesto Gomez is a 61 year old male with the past medical history of cervical spine arthritis and degenerative joint disease with nerve pain s/p surgery, gout, seasonal allergies, tobacco use, cocaine addiction, ETOH abuse, and depression. He presents to [facility] for medication refills and follow-up for pain.    HPI  Patient states for a while, he was taking Cymbalta 120 mg in the morning and 120 mg at the night. He believes the mistake came when his pills switched from 30 mg to 60 mg. He is now taking 60 mg in the morning and 60 g in the evening for a total of 120 mg for pain control. It was also noted that he is taking his gabapentin 600mg in the morning and 900 mg at night. He is not taking his 600 mg afternoon dose.    Patient also is requesting a prescription for bismuth salicylate chewable tablets for diarrhea. He states he gets it daily and this helps him.    Patient is taking allopurinol for his gout. He also takes Tylenol for pain control with flare ups. He has not refilled his additional month at this time.     Review of Systems   - Remaining 10 point system is negative other than what is noted in the HPI.    Objective   /76   Pulse 112   Temp 97.8  F (36.6  C) (Oral)   Resp 20   Wt 113.8 kg (250 lb 12.8 oz)   SpO2 93%   BMI 33.09 kg/m    Body mass index is 33.09 kg/m .    General appearance: alert, in no distress, cooperative  Head: normocephalic,  without obvious abnormalities  Eyes: conjunctivae/corneas clear  Ears: hearing grossly intact  Neurologic: ambulatory, spontaneously moving upper and lower extremities without pain  Psychologic: normal mood, no tangential thoughts, quick speech    ----- Service Performed and Documented by Resident or Fellow ------

## 2022-08-03 NOTE — PATIENT INSTRUCTIONS
August 3, 2022 (pt no longer wants to be seen @ [facility])    [facility]    [place]: [address]. [place] [address]  Phone: [phone]  Scheduling line: [phone]  Fax: [phone]    Fax referral, demographic, notes, & med lists to [phone], [facility] will contact pt for an appt.     Osiris Morin

## 2022-08-24 DIAGNOSIS — F33.1 MODERATE EPISODE OF RECURRENT MAJOR DEPRESSIVE DISORDER (H): ICD-10-CM

## 2022-08-24 DIAGNOSIS — M47.812 CERVICAL SPINE ARTHRITIS WITH NERVE PAIN: ICD-10-CM

## 2022-08-24 DIAGNOSIS — M79.2 CERVICAL SPINE ARTHRITIS WITH NERVE PAIN: ICD-10-CM

## 2022-08-26 RX ORDER — DULOXETIN HYDROCHLORIDE 60 MG/1
CAPSULE, DELAYED RELEASE ORAL
Qty: 60 CAPSULE | Refills: 3 | OUTPATIENT
Start: 2022-08-26

## 2022-09-08 ENCOUNTER — OFFICE VISIT (OUTPATIENT)
Dept: FAMILY MEDICINE | Facility: CLINIC | Age: 62
End: 2022-09-08
Payer: MEDICARE

## 2022-09-08 VITALS
BODY MASS INDEX: 35.16 KG/M2 | HEART RATE: 91 BPM | DIASTOLIC BLOOD PRESSURE: 91 MMHG | WEIGHT: 266.5 LBS | SYSTOLIC BLOOD PRESSURE: 144 MMHG | TEMPERATURE: 98.5 F | RESPIRATION RATE: 16 BRPM | OXYGEN SATURATION: 98 %

## 2022-09-08 DIAGNOSIS — E66.01 MORBID OBESITY (H): ICD-10-CM

## 2022-09-08 DIAGNOSIS — R06.01 ORTHOPNEA: ICD-10-CM

## 2022-09-08 DIAGNOSIS — I10 BENIGN ESSENTIAL HYPERTENSION: Primary | ICD-10-CM

## 2022-09-08 PROCEDURE — 99214 OFFICE O/P EST MOD 30 MIN: CPT | Performed by: FAMILY MEDICINE

## 2022-09-08 RX ORDER — FUROSEMIDE 20 MG
20 TABLET ORAL DAILY
Qty: 30 TABLET | Refills: 1 | Status: SHIPPED | OUTPATIENT
Start: 2022-09-08 | End: 2022-09-08

## 2022-09-08 NOTE — PATIENT INSTRUCTIONS
We will order a echocardiogram.    Take the lasix every morning for the swelling.    Follow up with us in 2 weeks.

## 2022-09-08 NOTE — PROGRESS NOTES
"  Assessment & Plan     Benign essential hypertension    - Echocardiogram Complete; Future  - furosemide (LASIX) 20 MG tablet; Take 1 tablet (20 mg) by mouth daily  - Comprehensive metabolic panel; Future    Orthopnea  Concerned about possibility of heart failure or increased edema from CCB  - Echocardiogram Complete; Future  - furosemide (LASIX) 20 MG tablet; Take 1 tablet (20 mg) by mouth daily  - N terminal pro BNP outpatient; Future    Morbid obesity (H)  Will need to consider diet and exercise, but will address medical concerns now that are negatively impacting his ability to lose weight and stay active.       Nicotine/Tobacco Cessation:  He reports that he has been smoking. He has never used smokeless tobacco.  Nicotine/Tobacco Cessation Plan:   Self help information given to patient      BMI:   Estimated body mass index is 35.16 kg/m  as calculated from the following:    Height as of 12/14/21: 1.854 m (6' 1\").    Weight as of this encounter: 120.9 kg (266 lb 8 oz).   Weight management plan: Discussed healthy diet and exercise guidelines        No follow-ups on file.    Reji Naidu MD  [facility]   Ernesto is a 61 year old, presenting for the following health issues:  Other (Feet swelling up, feel like ready to pop. Still can't catch breath. ), Headache, Breathing Problem, and Back Pain      HPI     Ernesto is a 62 yo male who presents with le edema and shortness of breath.  He feels like he has put on about 40 pounds in the past year or so.  His activity is severely limited by dyspnea on exertion.  He has had orthopnea and sleeps in a recliner.  His le edema is about to the level of the knee and is bilateral.  No numbness or ulcers, but the sewelling is painful.  No Chest pain or palpitations.  His abdomen feels bloated, but he's eating ok.    Review of Systems   Constitutional, HEENT, cardiovascular, pulmonary, gi and gu systems are negative, except as otherwise noted.   "    Objective    BP (!) 144/91   Pulse 91   Temp 98.5  F (36.9  C) (Oral)   Resp 16   Wt 120.9 kg (266 lb 8 oz)   SpO2 98%   BMI 35.16 kg/m    Body mass index is 35.16 kg/m .  Physical Exam   GENERAL: healthy, alert and no distress  EYES: Eyes grossly normal to inspection, PERRL and conjunctivae and sclerae normal  HENT: ear canals and TM's normal, nose and mouth without ulcers or lesions  NECK: no adenopathy, no asymmetry, masses, or scars and thyroid normal to palpation  RESP: lungs clear to auscultation - no rales, rhonchi or wheezes and occasional crackle in the lower lung fields, prolonged expiratory phase  CV: regular rate and rhythm, normal S1 S2, no S3 or S4, no murmur, click or rub, no peripheral edema and peripheral pulses strong  ABDOMEN: bowel sounds normal and distended, but non tender  MS: 2 + le edema bilaterally to the level of the upper shin, no ulcerations, some mild redness  SKIN: no suspicious lesions or rashes  NEURO: Normal strength and tone, mentation intact and speech normal  PSYCH: mentation appears normal, affect normal/bright

## 2022-09-13 ENCOUNTER — TELEPHONE (OUTPATIENT)
Dept: FAMILY MEDICINE | Facility: CLINIC | Age: 62
End: 2022-09-13

## 2022-09-13 NOTE — TELEPHONE ENCOUNTER
9/14/2022 Pain Manag referral: Appt scheduled with [facility].    Appointment date: Tuesday, 09/27 @ 1:30pm  Location: [place]: [address] [place] [address] [address]. [place] [address]  Phone: [phone]          09/13/2022  I was able to reach Ernesto this afternoon to assist with rescheduling the following referrals appointment.     Echocardiogram: 9/17 @ 745am with [facility]    Spine referral: 9/20 @ 9am with [facility]  Location: [address] [place] [phone]    Physical Therapy: 10/18 @ 1130am (pt placed on the cancellation list for an earlier appt) with [facility]  Location: [address] [place] [id number]     Pain Manag: Is not schedule yet d/t calls disconnected. Call pt back 2x, pt did not answer. Will call again in the morning.    Once all requested referral are schedule will send pt a copy of appts. For now pt is aware of scheduled appts and location.     Osiris Morin

## 2022-09-13 NOTE — TELEPHONE ENCOUNTER
Pt returned call to Clinic Supervisor. Spoke with patient.     Patient is requesting assistance with making appointments for the following referrals. Please return call to patient and assist him with scheduling.     Spine Referral  Physical Therapy  Echocardiogram  Pain Management     Thank you,    Mirella Monaco

## 2022-09-13 NOTE — TELEPHONE ENCOUNTER
Called patient on 8/9 and again today 9/13 to follow up on his visit from 8/5/22. Per pt's [facility] Comment, Pt was scheduled for spine referral and did not know when or where he was supposed to go.     Pt missed both appointments. Calling to help reschedule.    If pt returns call, please connect with Clinic Supervisor.     Mirella Monaco

## 2022-09-17 ENCOUNTER — HOSPITAL ENCOUNTER (OUTPATIENT)
Dept: CARDIOLOGY | Facility: HOSPITAL | Age: 62
Discharge: HOME | End: 2022-09-17
Attending: FAMILY MEDICINE | Admitting: FAMILY MEDICINE
Payer: MEDICARE

## 2022-09-17 DIAGNOSIS — I10 BENIGN ESSENTIAL HYPERTENSION: ICD-10-CM

## 2022-09-17 DIAGNOSIS — R06.01 ORTHOPNEA: ICD-10-CM

## 2022-09-17 LAB — LVEF ECHO: NORMAL

## 2022-09-17 PROCEDURE — 999N000208 ECHOCARDIOGRAM COMPLETE

## 2022-09-17 PROCEDURE — 255N000002 HC RX 255 OP 636: Performed by: FAMILY MEDICINE

## 2022-09-17 RX ADMIN — PERFLUTREN 4 ML: 6.52 INJECTION, SUSPENSION INTRAVENOUS at 08:30

## 2022-09-22 NOTE — PROGRESS NOTES
ASSESSMENT: Ernesto Gomez is a 61 year old male with past medical history significant for cocaine addiction, COPD, GERD, obesity, tobacco abuse, EtOH abusewho presents today for new patient evaluation of neck pain and low back pain.  1.  Chronic bilateral neck pain.  Patient has a history of a C5-C7 ACDF in 2011 and then extension of his fusion from C3-C5 2021 with Dr. Singh.  Patient reports that he has had ongoing neck pain ever since surgery.  He has not had any postoperative MRI scans.  He has associated headaches.  He has also had ongoing swallowing difficulty ever since surgery.  2.  Chronic bilateral low back pain without radicular symptoms.  My review of an MRI lumbar spine from [facility] dated June 5, 2021 shows multilevel degenerative change.  Pain seems most consistent with lumbar facet arthropathy.  He does have facet arthropathy in the bilateral L4-5 and L5-S1 facet joints.  - Patient is neurologically intact on exam.      PLAN:  A shared decision making model was used.  The patient's values and choices were respected.  The following represents what was discussed and decided upon by the physician assistant and the patient.      1.  DIAGNOSTIC TESTS:    -I reviewed the MRI lumbar spine from June 5, 2021.  - I also reviewed a preoperative MRI cervical spine from May 2021.  - No additional diagnostic tests were ordered.  Patient wishes to focus on his lower back pain first.  He may need additional imaging of the cervical spine.    2.  PHYSICAL THERAPY: Patient scheduled begin physical therapy October 18, 2022.    3.  MEDICATIONS: No changes are made to the patient's medications.  - I do not feel comfortable prescribing opiates for this patient with history of polysubstance abuse.  He has ongoing alcohol use disorder.  He has a history of gabapentin overdose.  - Patient currently takes duloxetine 60 mg daily.  - Patient takes gabapentin 600 mg in the morning, 600 mg in the afternoon, and 900 mg  at bedtime.  - Patient takes ibuprofen as needed.    4.  INTERVENTIONS: No interventions were ordered today.  Patient is most interested in having steroid injections for his lower back pain.  He reports that he had steroid injections in his low back 10 to 15 years ago which provided significant relief of his pain lasting years.  I think he would benefit most from bilateral L4-5, L5-S1 facet joint injections.  We will check with his insurance to see if that is authorized.  - If not, I recommend bilateral L3, 4, 5 medial branch blocks as a work-up for radiofrequency ablation.    5.  PATIENT EDUCATION: Patient is in agreement the above plan.  All questions were answered.  - I recommended that the patient follow-up with his primary care provider about leg swelling.  It sounds like he is not taking his furosemide as prescribed.    6.  REFERRALS: Patient is very concerned about his ongoing difficulty swallowing following cervical spine surgery.  It has been more than 1 year.  Pills get caught in his throat.  I entered a referral to ENT.      7.  FOLLOW-UP:   I am going to check with her prior authorization team to see if he could get authorization for lumbar facet joint injections.  If yes, I will order bilateral L4-5, L5-S1 facet joint injections.  If no, I will order bilateral L3, L4, L5 medial branch blocks as work-up toward radiofrequency ablation.  Anderson call him with an update.        SUBJECTIVE:  Ernesto Gomez  Is a 61 year old male who presents today in consultation at the request of Dr. Zoraida Maxwell for new patient evaluation of neck pain and low back pain.  Patient is a very difficult historian.    Patient complains of bilateral neck pain.  He has a history of cervical spine fusion in 2011 and then again in July 2021 with Dr. Singh.  Patient reports he has had ongoing neck pain and headaches ever since his neck surgery.  He has also had difficulty swallowing ever since his neck surgery.  Pain is  located on both sides of the neck and extends into the shoulders.  Denies pain further down the arm.  He has headaches worse with coughing which he feels are related to his neck pain.    Patient complains of bilateral low back pain.  Pain spans across low back in a broadband distribution at the belt line.  He states he has had pain for over 10 years.  He had cortisone shots in his lower back about 10 years ago which provided significant relief of his pain at that time.  He is very interested in having steroid injections again.  He denies any pain radiating from the buttocks down the legs.  Denies any numbness, tingling, weakness down the legs.  Denies loss of bowel or bladder control.  Denies recent fevers.    Patient states he has had physical therapy for his neck following his surgery in 2021.  He has not had any physical therapy for his lower back.  He is scheduled begin physical therapy next month for his neck and lower back.  He went to 1 chiropractic treatment 6 months ago.  He currently takes duloxetine 60 mg daily, gabapentin 600 mg in the morning, 600 mg in the afternoon, and 900 mg at bedtime.  He also takes ibuprofen 800 mg as needed.  Medications are somewhat helpful.    Current Outpatient Medications   Medication     acetaminophen (TYLENOL) 500 MG tablet     allopurinol (ZYLOPRIM) 100 MG tablet     amLODIPine (NORVASC) 10 MG tablet     bismuth subsalicylate (PEPTO BISMOL) 262 MG chewable tablet     ciclopirox (PENLAC) 8 % external solution     diphenhydrAMINE (BENADRYL) 25 MG tablet     DULoxetine (CYMBALTA) 60 MG capsule     furosemide (LASIX) 20 MG tablet     gabapentin (NEURONTIN) 300 MG capsule     ibuprofen (ADVIL/MOTRIN) 800 MG tablet     lidocaine (XYLOCAINE) 5 % external ointment     lidocaine-prilocaine (EMLA) 2.5-2.5 % external cream         No Known Allergies    Past Medical History:   Diagnosis Date     Alcohol abuse      Cervical disc disease      Cocaine addiction (H)      COPD (chronic  obstructive pulmonary disease) (H) 6/15/2022     DJD (degenerative joint disease) 12/13/2006     GERD (gastroesophageal reflux disease)      Tobacco abuse         Patient Active Problem List   Diagnosis     Cervical spine arthritis with nerve pain     Current moderate episode of major depressive disorder without prior episode (H)     DJD (degenerative joint disease)     Tobacco abuse     GERD (gastroesophageal reflux disease)     ETOH abuse     Cocaine addiction (H)     COPD (chronic obstructive pulmonary disease) (H)     Morbid obesity (H)       Past Surgical History:   Procedure Laterality Date     NECK SURGERY  07/13/2021    cervical spinal fusion       Family history: None    Social history: Patient is on disability.  He is single.  He smokes 1/2 pack of cigarettes per day.  He drinks 1/5 of liquor per day.  Denies illicit drug use.    ROS: Positive for weight gain, sexual dysfunction, difficulty swallowing, chest pain, feet/leg swelling, shortness of breath, wheezing, joint pain, itching, dizziness, excessive tiredness, anxiety, depression.  Specifically negative for dysphagia, imbalance, fine motor skill difficulties, bowel/bladder dysfunction, fevers,chills, appetite changes, unexplained weight loss.   Otherwise 13 systems reviewed are negative.  Please see the patient's intake questionnaire from today for details.      OBJECTIVE:  PHYSICAL EXAMINATION:  CONSTITUTIONAL:  Vital signs as above.  Patient appears anxious.  The patient is well nourished and well groomed.  PSYCHIATRIC:  The patient is awake, alert, oriented to person, place, time and answering questions appropriately with clear speech.    HEENT:  Normocephalic, atraumatic.  Sclera clear.    SKIN:  Skin over the face, bilateral upper extremities, and neck is clean, dry, intact without rashes.  LYMPH NODES:  No palpable or tender anterior/posterior cervical, submandibular, or supraclavicular lymph nodes.    MUSCLE STRENGTH:  5/5 strength for the  bilateral shoulder abductors, elbow flexors/extensors, wrist extensors, finger flexors/abductors, hip flexors, knee flexors/extensors, ankle dorsi/plantar flexors.  NEURO:  CN III-XII are grossly intact.  1+ symmetric biceps, brachioradialis, triceps, patellar, and Achilles reflexes bilaterally.  Sensation to light touch is intact bilateral upper and lower extremities throughout.  Negative Camargo's bilaterally.    VASCULAR: Bilateral lower extremity edema.  MUSCULOSKELETAL: No significant tenderness palpation bilateral lower lumbar paraspinous muscles.  No significant tenderness palpation bilateral cervical paraspinous muscles or upper trapezius muscle.  He does have hypertonicity bilateral upper trapezius muscles.  Lumbar flexion and extension are both moderately restricted.  Positive facet loading maneuvers bilaterally.    RESULTS: I reviewed the MRI lumbar spine from [facility] dated June 5, 2021.  At L5-S1 there is severe disc degeneration and right hypertrophic facet joint degeneration with moderate right and mild left foraminal stenosis.  At L4-5 there is a right foraminal disc extrusion impinging the right L4 ganglion.  There is also increased circumferential epidural fat constricting the dural sac.  Please see report for further details.    I reviewed the MRI cervical spine from [facility] dated May 27, 2021 (preoperative).  This shows active right C4-5 facet joint osteoarthropathy.  There is grade 1 spondylolisthesis C7-T1 without central canal stenosis.  There is hypertrophic facet degeneration on the left at C2-3 and C3-4.  There is severe left C3-4 foraminal stenosis.  There is a solid fusion C5-C7.

## 2022-09-27 ENCOUNTER — OFFICE VISIT (OUTPATIENT)
Dept: PHYSICAL MEDICINE AND REHAB | Facility: CLINIC | Age: 62
End: 2022-09-27
Payer: MEDICARE

## 2022-09-27 VITALS
HEART RATE: 109 BPM | SYSTOLIC BLOOD PRESSURE: 138 MMHG | WEIGHT: 266 LBS | BODY MASS INDEX: 35.25 KG/M2 | TEMPERATURE: 98.4 F | HEIGHT: 73 IN | DIASTOLIC BLOOD PRESSURE: 90 MMHG

## 2022-09-27 DIAGNOSIS — M54.2 CERVICALGIA: ICD-10-CM

## 2022-09-27 DIAGNOSIS — R13.10 DYSPHAGIA, UNSPECIFIED TYPE: Primary | ICD-10-CM

## 2022-09-27 DIAGNOSIS — Z98.1 HISTORY OF FUSION OF CERVICAL SPINE: ICD-10-CM

## 2022-09-27 DIAGNOSIS — M47.816 LUMBAR FACET ARTHROPATHY: ICD-10-CM

## 2022-09-27 PROCEDURE — 99204 OFFICE O/P NEW MOD 45 MIN: CPT | Performed by: PHYSICIAN ASSISTANT

## 2022-09-27 ASSESSMENT — PAIN SCALES - GENERAL: PAINLEVEL: SEVERE PAIN (7)

## 2022-09-27 NOTE — LETTER
9/27/2022         RE: Ernesto Gomez  [address]  [place] [address]        Dear Colleague,    Thank you for referring your patient, Ernesto Gomez, to the [facility]ERY. Please see a copy of my visit note below.    ASSESSMENT: Ernesto Gomez is a 61 year old male with past medical history significant for cocaine addiction, COPD, GERD, obesity, tobacco abuse, EtOH abusewho presents today for new patient evaluation of neck pain and low back pain.  1.  Chronic bilateral neck pain.  Patient has a history of a C5-C7 ACDF in 2011 and then extension of his fusion from C3-C5 2021 with Dr. Singh.  Patient reports that he has had ongoing neck pain ever since surgery.  He has not had any postoperative MRI scans.  He has associated headaches.  He has also had ongoing swallowing difficulty ever since surgery.  2.  Chronic bilateral low back pain without radicular symptoms.  My review of an MRI lumbar spine from [facility] dated June 5, 2021 shows multilevel degenerative change.  Pain seems most consistent with lumbar facet arthropathy.  He does have facet arthropathy in the bilateral L4-5 and L5-S1 facet joints.  - Patient is neurologically intact on exam.      PLAN:  A shared decision making model was used.  The patient's values and choices were respected.  The following represents what was discussed and decided upon by the physician assistant and the patient.      1.  DIAGNOSTIC TESTS:    -I reviewed the MRI lumbar spine from June 5, 2021.  - I also reviewed a preoperative MRI cervical spine from May 2021.  - No additional diagnostic tests were ordered.  Patient wishes to focus on his lower back pain first.  He may need additional imaging of the cervical spine.    2.  PHYSICAL THERAPY: Patient scheduled begin physical therapy October 18, 2022.    3.  MEDICATIONS: No changes are made to the patient's medications.  - I do not feel comfortable prescribing opiates for  this patient with history of polysubstance abuse.  He has ongoing alcohol use disorder.  He has a history of gabapentin overdose.  - Patient currently takes duloxetine 60 mg daily.  - Patient takes gabapentin 600 mg in the morning, 600 mg in the afternoon, and 900 mg at bedtime.  - Patient takes ibuprofen as needed.    4.  INTERVENTIONS: No interventions were ordered today.  Patient is most interested in having steroid injections for his lower back pain.  He reports that he had steroid injections in his low back 10 to 15 years ago which provided significant relief of his pain lasting years.  I think he would benefit most from bilateral L4-5, L5-S1 facet joint injections.  We will check with his insurance to see if that is authorized.  - If not, I recommend bilateral L3, 4, 5 medial branch blocks as a work-up for radiofrequency ablation.    5.  PATIENT EDUCATION: Patient is in agreement the above plan.  All questions were answered.  - I recommended that the patient follow-up with his primary care provider about leg swelling.  It sounds like he is not taking his furosemide as prescribed.    6.  REFERRALS: Patient is very concerned about his ongoing difficulty swallowing following cervical spine surgery.  It has been more than 1 year.  Pills get caught in his throat.  I entered a referral to ENT.      7.  FOLLOW-UP:   I am going to check with her prior authorization team to see if he could get authorization for lumbar facet joint injections.  If yes, I will order bilateral L4-5, L5-S1 facet joint injections.  If no, I will order bilateral L3, L4, L5 medial branch blocks as work-up toward radiofrequency ablation.  Anderson call him with an update.        SUBJECTIVE:  Ernesto HACKETT Yousifjames  Is a 61 year old male who presents today in consultation at the request of Dr. Zoraida Maxwell for new patient evaluation of neck pain and low back pain.  Patient is a very difficult historian.    Patient complains of bilateral neck pain.   He has a history of cervical spine fusion in 2011 and then again in July 2021 with Dr. Singh.  Patient reports he has had ongoing neck pain and headaches ever since his neck surgery.  He has also had difficulty swallowing ever since his neck surgery.  Pain is located on both sides of the neck and extends into the shoulders.  Denies pain further down the arm.  He has headaches worse with coughing which he feels are related to his neck pain.    Patient complains of bilateral low back pain.  Pain spans across low back in a broadband distribution at the belt line.  He states he has had pain for over 10 years.  He had cortisone shots in his lower back about 10 years ago which provided significant relief of his pain at that time.  He is very interested in having steroid injections again.  He denies any pain radiating from the buttocks down the legs.  Denies any numbness, tingling, weakness down the legs.  Denies loss of bowel or bladder control.  Denies recent fevers.    Patient states he has had physical therapy for his neck following his surgery in 2021.  He has not had any physical therapy for his lower back.  He is scheduled begin physical therapy next month for his neck and lower back.  He went to 1 chiropractic treatment 6 months ago.  He currently takes duloxetine 60 mg daily, gabapentin 600 mg in the morning, 600 mg in the afternoon, and 900 mg at bedtime.  He also takes ibuprofen 800 mg as needed.  Medications are somewhat helpful.    Current Outpatient Medications   Medication     acetaminophen (TYLENOL) 500 MG tablet     allopurinol (ZYLOPRIM) 100 MG tablet     amLODIPine (NORVASC) 10 MG tablet     bismuth subsalicylate (PEPTO BISMOL) 262 MG chewable tablet     ciclopirox (PENLAC) 8 % external solution     diphenhydrAMINE (BENADRYL) 25 MG tablet     DULoxetine (CYMBALTA) 60 MG capsule     furosemide (LASIX) 20 MG tablet     gabapentin (NEURONTIN) 300 MG capsule     ibuprofen (ADVIL/MOTRIN) 800 MG tablet      lidocaine (XYLOCAINE) 5 % external ointment     lidocaine-prilocaine (EMLA) 2.5-2.5 % external cream         No Known Allergies    Past Medical History:   Diagnosis Date     Alcohol abuse      Cervical disc disease      Cocaine addiction (H)      COPD (chronic obstructive pulmonary disease) (H) 6/15/2022     DJD (degenerative joint disease) 12/13/2006     GERD (gastroesophageal reflux disease)      Tobacco abuse         Patient Active Problem List   Diagnosis     Cervical spine arthritis with nerve pain     Current moderate episode of major depressive disorder without prior episode (H)     DJD (degenerative joint disease)     Tobacco abuse     GERD (gastroesophageal reflux disease)     ETOH abuse     Cocaine addiction (H)     COPD (chronic obstructive pulmonary disease) (H)     Morbid obesity (H)       Past Surgical History:   Procedure Laterality Date     NECK SURGERY  07/13/2021    cervical spinal fusion       Family history: None    Social history: Patient is on disability.  He is single.  He smokes 1/2 pack of cigarettes per day.  He drinks 1/5 of liquor per day.  Denies illicit drug use.    ROS: Positive for weight gain, sexual dysfunction, difficulty swallowing, chest pain, feet/leg swelling, shortness of breath, wheezing, joint pain, itching, dizziness, excessive tiredness, anxiety, depression.  Specifically negative for dysphagia, imbalance, fine motor skill difficulties, bowel/bladder dysfunction, fevers,chills, appetite changes, unexplained weight loss.   Otherwise 13 systems reviewed are negative.  Please see the patient's intake questionnaire from today for details.      OBJECTIVE:  PHYSICAL EXAMINATION:  CONSTITUTIONAL:  Vital signs as above.  Patient appears anxious.  The patient is well nourished and well groomed.  PSYCHIATRIC:  The patient is awake, alert, oriented to person, place, time and answering questions appropriately with clear speech.    HEENT:  Normocephalic, atraumatic.  Sclera clear.     SKIN:  Skin over the face, bilateral upper extremities, and neck is clean, dry, intact without rashes.  LYMPH NODES:  No palpable or tender anterior/posterior cervical, submandibular, or supraclavicular lymph nodes.    MUSCLE STRENGTH:  5/5 strength for the bilateral shoulder abductors, elbow flexors/extensors, wrist extensors, finger flexors/abductors, hip flexors, knee flexors/extensors, ankle dorsi/plantar flexors.  NEURO:  CN III-XII are grossly intact.  1+ symmetric biceps, brachioradialis, triceps, patellar, and Achilles reflexes bilaterally.  Sensation to light touch is intact bilateral upper and lower extremities throughout.  Negative Camargo's bilaterally.    VASCULAR: Bilateral lower extremity edema.  MUSCULOSKELETAL: No significant tenderness palpation bilateral lower lumbar paraspinous muscles.  No significant tenderness palpation bilateral cervical paraspinous muscles or upper trapezius muscle.  He does have hypertonicity bilateral upper trapezius muscles.  Lumbar flexion and extension are both moderately restricted.  Positive facet loading maneuvers bilaterally.    RESULTS: I reviewed the MRI lumbar spine from [email or web address] dated June 5, 2021.  At L5-S1 there is severe disc degeneration and right hypertrophic facet joint degeneration with moderate right and mild left foraminal stenosis.  At L4-5 there is a right foraminal disc extrusion impinging the right L4 ganglion.  There is also increased circumferential epidural fat constricting the dural sac.  Please see report for further details.    I reviewed the MRI cervical spine from [facility] dated May 27, 2021 (preoperative).  This shows active right C4-5 facet joint osteoarthropathy.  There is grade 1 spondylolisthesis C7-T1 without central canal stenosis.  There is hypertrophic facet degeneration on the left at C2-3 and C3-4.  There is severe left C3-4 foraminal stenosis.  There is a solid fusion C5-C7.        Again, thank you for allowing me  to participate in the care of your patient.        Sincerely,        Sarah Reddy PA-C

## 2022-09-27 NOTE — PATIENT INSTRUCTIONS
We will call you with an update after I hear back from the insurance team about coverage for injections to help with your lower back pain.    I entered a referral to ENT for further evaluation of your difficulty swallowing.

## 2022-09-28 ENCOUNTER — TELEPHONE (OUTPATIENT)
Dept: PHYSICAL MEDICINE AND REHAB | Facility: CLINIC | Age: 62
End: 2022-09-28

## 2022-09-28 DIAGNOSIS — M47.816 LUMBAR FACET ARTHROPATHY: Primary | ICD-10-CM

## 2022-09-28 NOTE — TELEPHONE ENCOUNTER
----- Message from Sarah Reddy PA-C sent at 9/28/2022 12:33 PM CDT -----  Regarding: injection plan  Please call this patient and let him know that I heard back from the insurance team.  His insurance does not cover facet joint injections.  Please offer bilateral L3, L4, L5 medial branch blocks as a work-up for radiofrequency ablation.  I am happy to see him back if he would like to talk about this procedure in more detail.

## 2022-09-28 NOTE — TELEPHONE ENCOUNTER
Phone call to patient to discuss information from [employer] regarding coverage for the facet joint injection and MBBs. Long discussion had regarding the difference in injections and the process for the MBBs. Patient wants to set up the appointment for the injections. Injection requirements reviewed in full with patient. Stated understanding. Placed on hold. Patient disconnected call.     Attempted to reach him again to assist in scheduling. Left message to return call.

## 2022-10-07 DIAGNOSIS — M10.071 ACUTE IDIOPATHIC GOUT OF RIGHT ANKLE: ICD-10-CM

## 2022-10-07 RX ORDER — ALLOPURINOL 100 MG/1
100 TABLET ORAL DAILY
Qty: 30 TABLET | Refills: 1 | OUTPATIENT
Start: 2022-10-07

## 2022-10-11 NOTE — LETTER
October 11, 2022  Re: Ernesto Gomez  YOB: 1960    Dear Colleague,    Thank you for your referral to the [facility] EAR NOSE AND THROAT [facility]. We have been unable to reach the patient after at least three contact attempts.      If you have any questions or concerns, please contact our office at Dept: [phone].        Sincerely,  [facility]

## 2022-10-28 ENCOUNTER — OFFICE VISIT (OUTPATIENT)
Dept: FAMILY MEDICINE | Facility: CLINIC | Age: 62
End: 2022-10-28
Payer: MEDICARE

## 2022-10-28 VITALS
OXYGEN SATURATION: 96 % | DIASTOLIC BLOOD PRESSURE: 85 MMHG | BODY MASS INDEX: 35.75 KG/M2 | TEMPERATURE: 98.2 F | RESPIRATION RATE: 24 BRPM | SYSTOLIC BLOOD PRESSURE: 148 MMHG | HEART RATE: 113 BPM | WEIGHT: 271 LBS

## 2022-10-28 DIAGNOSIS — I10 BENIGN ESSENTIAL HYPERTENSION: ICD-10-CM

## 2022-10-28 DIAGNOSIS — M79.2 CERVICAL SPINE ARTHRITIS WITH NERVE PAIN: ICD-10-CM

## 2022-10-28 DIAGNOSIS — R14.0 ABDOMINAL DISTENTION: Primary | ICD-10-CM

## 2022-10-28 DIAGNOSIS — M47.812 CERVICAL SPINE ARTHRITIS WITH NERVE PAIN: ICD-10-CM

## 2022-10-28 DIAGNOSIS — F33.1 MODERATE EPISODE OF RECURRENT MAJOR DEPRESSIVE DISORDER (H): ICD-10-CM

## 2022-10-28 LAB
ALBUMIN SERPL BCG-MCNC: 4 G/DL (ref 3.5–5.2)
ALP SERPL-CCNC: 112 U/L (ref 40–129)
ALT SERPL W P-5'-P-CCNC: 120 U/L (ref 10–50)
ANION GAP SERPL CALCULATED.3IONS-SCNC: 14 MMOL/L (ref 7–15)
AST SERPL W P-5'-P-CCNC: 114 U/L (ref 10–50)
BILIRUB SERPL-MCNC: 0.3 MG/DL
BUN SERPL-MCNC: 14.9 MG/DL (ref 8–23)
CALCIUM SERPL-MCNC: 9.6 MG/DL (ref 8.8–10.2)
CHLORIDE SERPL-SCNC: 104 MMOL/L (ref 98–107)
CREAT SERPL-MCNC: 1.01 MG/DL (ref 0.67–1.17)
DEPRECATED HCO3 PLAS-SCNC: 22 MMOL/L (ref 22–29)
GFR SERPL CREATININE-BSD FRML MDRD: 85 ML/MIN/1.73M2
GLUCOSE SERPL-MCNC: 273 MG/DL (ref 70–99)
POTASSIUM SERPL-SCNC: 3.9 MMOL/L (ref 3.4–5.3)
PROT SERPL-MCNC: 6.7 G/DL (ref 6.4–8.3)
SODIUM SERPL-SCNC: 140 MMOL/L (ref 136–145)

## 2022-10-28 PROCEDURE — 80053 COMPREHEN METABOLIC PANEL: CPT | Performed by: FAMILY MEDICINE

## 2022-10-28 PROCEDURE — 36415 COLL VENOUS BLD VENIPUNCTURE: CPT | Performed by: FAMILY MEDICINE

## 2022-10-28 PROCEDURE — 99214 OFFICE O/P EST MOD 30 MIN: CPT | Performed by: FAMILY MEDICINE

## 2022-10-28 RX ORDER — DULOXETIN HYDROCHLORIDE 60 MG/1
CAPSULE, DELAYED RELEASE ORAL
Qty: 60 CAPSULE | Refills: 3 | Status: SHIPPED | OUTPATIENT
Start: 2022-10-28 | End: 2023-02-10

## 2022-10-28 RX ORDER — LISINOPRIL 20 MG/1
20 TABLET ORAL DAILY
Qty: 30 TABLET | Refills: 11 | Status: SHIPPED | OUTPATIENT
Start: 2022-10-28 | End: 2022-10-28

## 2022-10-28 RX ORDER — FUROSEMIDE 20 MG
20 TABLET ORAL DAILY
Qty: 90 TABLET | Refills: 1 | Status: SHIPPED | OUTPATIENT
Start: 2022-10-28 | End: 2023-01-19

## 2022-10-28 ASSESSMENT — PATIENT HEALTH QUESTIONNAIRE - PHQ9: SUM OF ALL RESPONSES TO PHQ QUESTIONS 1-9: 25

## 2022-10-28 NOTE — LETTER
November 9, 2022      Ernesto Gomez  [address]  [place] [address]        Dear ,    We are writing to inform you of your test results.    Your bloodwork was significant for elevated blood sugar and inflammation of your liver.  We will need to test you for diabetes next time you come in.  Additionally, cutting back on your alcohol intake may help reduce the inflammation in your liver.  That inflammation may be adding to your sense of bloating in your stomach.       If you have any questions or concerns, please call the clinic at the number listed above.       Sincerely,      Reji Naidu MD                     Resulted Orders   Comprehensive metabolic panel   Result Value Ref Range    Sodium 140 136 - 145 mmol/L    Potassium 3.9 3.4 - 5.3 mmol/L    Chloride 104 98 - 107 mmol/L    Carbon Dioxide (CO2) 22 22 - 29 mmol/L    Anion Gap 14 7 - 15 mmol/L    Urea Nitrogen 14.9 8.0 - 23.0 mg/dL    Creatinine 1.01 0.67 - 1.17 mg/dL    Calcium 9.6 8.8 - 10.2 mg/dL    Glucose 273 (H) 70 - 99 mg/dL    Alkaline Phosphatase 112 40 - 129 U/L     (H) 10 - 50 U/L     (H) 10 - 50 U/L    Protein Total 6.7 6.4 - 8.3 g/dL    Albumin 4.0 3.5 - 5.2 g/dL    Bilirubin Total 0.3 <=1.2 mg/dL    GFR Estimate 85 >60 mL/min/1.73m2      Comment:      Effective December 21, 2021 eGFRcr in adults is calculated using the 2021 CKD-EPI creatinine equation which includes age and gender (Bradford et al., NEJM, DOI: 10.1056/FUYEbf2520860)

## 2022-10-28 NOTE — PROGRESS NOTES
Assessment & Plan     Abdominal distention    - US Abdomen Complete; Future    Cervical spine arthritis with nerve pain    - DULoxetine (CYMBALTA) 60 MG capsule; TAKE 1 CAPSULE(30 MG) BY MOUTH DAILY    Benign essential hypertension  Stop amlodipine, start lisinopril, f/u in 2 weeks  - lisinopril (ZESTRIL) 20 MG tablet; Take 1 tablet (20 mg) by mouth daily  - furosemide (LASIX) 20 MG tablet; Take 1 tablet (20 mg) by mouth daily  - Comprehensive metabolic panel; Future  - Comprehensive metabolic panel    Moderate episode of recurrent major depressive disorder (H)  Worsened with current pain and immobility, no active SI  - DULoxetine (CYMBALTA) 60 MG capsule; TAKE 1 CAPSULE(30 MG) BY MOUTH DAILY             Depression Screening Follow Up    PHQ 10/28/2022   PHQ-9 Total Score 25   Q9: Thoughts of better off dead/self-harm past 2 weeks Nearly every day     Last PHQ-9 10/28/2022   1.  Little interest or pleasure in doing things 3   2.  Feeling down, depressed, or hopeless 3   3.  Trouble falling or staying asleep, or sleeping too much 3   4.  Feeling tired or having little energy 3   5.  Poor appetite or overeating 1   6.  Feeling bad about yourself 3   7.  Trouble concentrating 3   8.  Moving slowly or restless 3   Q9: Thoughts of better off dead/self-harm past 2 weeks 3   PHQ-9 Total Score 25   Difficulty at work, home, or with people Extremely dIfficult           Follow Up      Follow Up Actions Taken  Crisis resource information provided in the After Visit Summary  Patient declined referral.  Pt simply wants answers and pain management.  No active SI    Discussed the following ways the patient can remain in a safe environment:  remove alcohol, remove access to firearms: doesn't have  and be around others  F/u with me in 1-2 weeks    No follow-ups on file.    Reji Naidu MD  [facility]   Ernesto is a 61 year old, presenting for the following health issues:  RECHECK (Follow up  arthritis pain, swelling )      HPI     Pt comes in with continued abdominal distention and painful swollen legs.  He continues to use the amlodipine for his bp.  He tried some furosemide with minimal results.  He had an echocardiogram that was normal, but didn't get his blood drawn at last visit.  He reports that the pain and immobility has made his depressive symptoms worse, but denies active SI or manic episodes, he simply wants to know what is going on with his body and some relief from pain.  He does drink heavily on a daily basis to help with the pain and is not interested in quitting at this time.  He denies any illegal drug usage.  He isn't interested in seeing a psychologist.    Review of Systems   Constitutional, HEENT, cardiovascular, pulmonary, gi and gu systems are negative, except as otherwise noted.      Objective    BP (!) 148/85 (BP Location: Right arm, Patient Position: Sitting, Cuff Size: Adult Large)   Pulse 113   Temp 98.2  F (36.8  C) (Tympanic)   Resp 24   Wt 122.9 kg (271 lb)   SpO2 96%   BMI 35.75 kg/m    Body mass index is 35.75 kg/m .  Physical Exam   GENERAL: alert, no distress and obese  EYES: Eyes grossly normal to inspection, PERRL and conjunctivae and sclerae normal  HENT: ear canals and TM's normal, nose and mouth without ulcers or lesions  NECK: no adenopathy, no asymmetry, masses, or scars and thyroid normal to palpation  RESP: lungs clear to auscultation - no rales, rhonchi or wheezes  CV: regular rate and rhythm, normal S1 S2, no S3 or S4, no murmur, click or rub, no peripheral edema and peripheral pulses strong  ABDOMEN: bowel sounds normal, no scars, striae, dilated veins, rashes, or lesions and distention throughout, possible fluid wave  MS: 1+ edema to mid shins  SKIN: no suspicious lesions or rashes and venous stasis dermatitis on the legs bilaterally  NEURO: Normal strength and tone, mentation intact and speech normal  PSYCH: mentation appears normal, affect  normal/bright

## 2022-11-03 ENCOUNTER — TELEPHONE (OUTPATIENT)
Dept: FAMILY MEDICINE | Facility: CLINIC | Age: 62
End: 2022-11-03

## 2022-11-03 NOTE — TELEPHONE ENCOUNTER
Please clarify if patient is to do 60 mg capsule or 30 mg capsule.   Rx was written for 60 mg but on the directions, it stated 30 mg.

## 2022-11-14 ENCOUNTER — OFFICE VISIT (OUTPATIENT)
Dept: FAMILY MEDICINE | Facility: CLINIC | Age: 62
End: 2022-11-14
Payer: MEDICARE

## 2022-11-14 VITALS
TEMPERATURE: 98.5 F | RESPIRATION RATE: 20 BRPM | OXYGEN SATURATION: 95 % | SYSTOLIC BLOOD PRESSURE: 138 MMHG | BODY MASS INDEX: 35.23 KG/M2 | HEART RATE: 117 BPM | DIASTOLIC BLOOD PRESSURE: 86 MMHG | WEIGHT: 267 LBS

## 2022-11-14 DIAGNOSIS — Z76.0 ENCOUNTER FOR MEDICATION REFILL: Primary | ICD-10-CM

## 2022-11-14 PROCEDURE — 99213 OFFICE O/P EST LOW 20 MIN: CPT | Mod: GC | Performed by: STUDENT IN AN ORGANIZED HEALTH CARE EDUCATION/TRAINING PROGRAM

## 2022-11-14 NOTE — PROGRESS NOTES
Preceptor Attestation:    I discussed the patient with the resident and evaluated the patient in person. I have verified the content of the note, which accurately reflects my assessment of the patient and the plan of care.   Supervising Physician:  Bhavik Kelly MD.

## 2022-11-14 NOTE — PROGRESS NOTES
Assessment and Plan    Ernesto was seen today for medication refill.  Patient unfamiliar with the medications that need to be refilled.  He stated he went to his pharmacy and could not find any refills.  Reviewing the record he stated his medication was refilled on 10/28.  Did contact the pharmacy, medication were sent to a different pharmacy.  Patient on did ask about a previous referral, did not received any call.  Staff did check on the referral personal who was not in the clinic today.  Patient became mad and agitated because the staff is not in the clinic.  He stated he would like to change the clinic and he left.  Diagnoses and all orders for this visit:    Encounter for medication refill               Options for treatment and follow-up care were reviewed with the patient. Patient engaged in the decision making process and verbalized understanding of the options discussed and agreed with the final plan.    Patient was staffed with attending physician MD Shashank Shelton MD PGY3           HPI       Ernesto Gomez is a 61 year old year old male w/ PMH of   Patient Active Problem List   Diagnosis     Cervical spine arthritis with nerve pain     Current moderate episode of major depressive disorder without prior episode (H)     DJD (degenerative joint disease)     Tobacco abuse     GERD (gastroesophageal reflux disease)     ETOH abuse     Cocaine addiction (H)     COPD (chronic obstructive pulmonary disease) (H)     Morbid obesity (H)    who presents for medication refill.  Patient stated he went to the pharmacy and did not find any refill on his medications.  When of the patient with medication, patient does not know the exact medication he would like to be refilled.  Patient was seen recently, 10/28th when most of his medication were sent out to an alternate pharmacy.      Problem, Medication and Allergy Lists were   reviewed and updated if needed.     Patient Active Problem List    Diagnosis  Date Noted     Morbid obesity (H) 09/08/2022     Priority: Medium     COPD (chronic obstructive pulmonary disease) (H) 06/15/2022     Priority: Medium     Cervical spine arthritis with nerve pain 05/05/2021     Priority: Medium     Current moderate episode of major depressive disorder without prior episode (H) 05/05/2021     Priority: Medium     Tobacco abuse 08/09/2012     Priority: Medium     ETOH abuse 08/09/2012     Priority: Medium     Cocaine addiction (H) 12/29/2009     Priority: Medium     DJD (degenerative joint disease) 12/13/2006     Priority: Medium     Formatting of this note might be different from the original.  C Spine surgery  9/06  ; DJD/ C Spine Disease  Formatting of this note might be different from the original.  C Spine surgery  9/06  ; DJD/ C Spine Disease       GERD (gastroesophageal reflux disease) 12/13/2006     Priority: Medium       Patient is an established patient of this clinic.         Review of Systems:   10 point ROS negative other than as specified above.         Physical Exam:   /86   Pulse 117   Temp 98.5  F (36.9  C) (Oral)   Resp 20   Wt 121.1 kg (267 lb)   SpO2 95%   BMI 35.23 kg/m      Vitals were reviewed and were normal     Exam:  Constitutional: healthy, alert, no distress, and cooperative  Head: Normocephalic. No masses, lesions, tenderness or abnormalities  Neck: Neck supple. No adenopathy.  ENT: throat normal without erythema or exudate  Cardiovascular: RRR w/o audible murmur  Respiratory: bilateral clear lungs w/o wheezing, crackles or rhonchi; breathing comfortably on RA  Gastrointestinal: Abdomen soft, non-tender. BS normal.  : Deferred  Musculoskeletal: extremities normal- no gross deformities noted, gait normal, and normal muscle tone  Skin: no suspicious lesions or rashes  Neurologic: grossly normal CN; normal strength, sensation, & tone  Psychiatric: mentation appears normal and affect normal/bright        Results:    Results from this visitNo  results found for any visits on 11/14/22.

## 2022-12-26 ENCOUNTER — ANCILLARY PROCEDURE (OUTPATIENT)
Dept: ULTRASOUND IMAGING | Facility: CLINIC | Age: 62
End: 2022-12-26
Attending: FAMILY MEDICINE
Payer: MEDICARE

## 2022-12-26 ENCOUNTER — OFFICE VISIT (OUTPATIENT)
Dept: FAMILY MEDICINE | Facility: CLINIC | Age: 62
End: 2022-12-26
Payer: MEDICARE

## 2022-12-26 DIAGNOSIS — R14.0 ABDOMINAL DISTENTION: ICD-10-CM

## 2022-12-26 DIAGNOSIS — R10.9 ABDOMINAL PAIN, UNSPECIFIED ABDOMINAL LOCATION: Primary | ICD-10-CM

## 2022-12-26 PROCEDURE — 76700 US EXAM ABDOM COMPLETE: CPT | Mod: TC | Performed by: RADIOLOGY

## 2022-12-26 NOTE — PROGRESS NOTES
Patient left prior to being seen by me.   Rooming staff noted patient was unsure of why he was in clinic. Stated he does not want to live with his pain anymore. Patient had abdominal US earlier in day, stated to sonographer that he did not want to live with his daily pain anymore as well. I reached out to [facility] and notified them of patient's suicidal ideations that I was unable to determine if they are passive or active and if he has an active plan. From chart checking, patient has hx of suicidal ideations with no previous attempt. [facility] will reach out to patient and determine if any interventions are needed, I also provided patient's emergency contact, his brother Jaguar Gomez, for them to contact if needed.     Luis Benitez

## 2022-12-27 NOTE — NURSING NOTE
While rooming a patient, he became irritated and left without being seen.

## 2023-01-19 ENCOUNTER — TELEPHONE (OUTPATIENT)
Dept: FAMILY MEDICINE | Facility: CLINIC | Age: 63
End: 2023-01-19
Payer: MEDICARE

## 2023-01-19 DIAGNOSIS — I10 BENIGN ESSENTIAL HYPERTENSION: ICD-10-CM

## 2023-01-19 RX ORDER — FUROSEMIDE 20 MG
20 TABLET ORAL 2 TIMES DAILY
Qty: 90 TABLET | Refills: 1 | Status: SHIPPED | OUTPATIENT
Start: 2023-01-19 | End: 2023-01-23

## 2023-01-19 RX ORDER — LISINOPRIL 20 MG/1
20 TABLET ORAL DAILY
Qty: 30 TABLET | Refills: 3 | Status: SHIPPED | OUTPATIENT
Start: 2023-01-19 | End: 2023-01-23

## 2023-01-19 NOTE — TELEPHONE ENCOUNTER
[facility] phone call message- patient requesting a refill:    Full Medication Name: furosemide (LASIX) 20 MG tablet    Dose: Take 1 tablet (20 mg) by mouth daily - Oral    lisinopril (ZESTRIL) 20 MG tablet - TAKE 1 TABLET(20 MG) BY MOUTH DAILY    Pharmacy confirmed as   [id number] - [place], [place] - [address] [place] [address] [address] [place] [address]  Phone: [phone] Fax: [phone]   : Yes    Additional Comments: Pt states this medication only works for a limited amount of time.  He does have swelling in his feet and is wondering if the doctor could prescribe something similar that might work a little better for him. Pt also states that the pharmacy is claiming they do not have a prescription for the Lisinopril or any refills.  Please check on this.  Pt is currently out of medication.    OK to leave a message on voice mail? Yes    Primary language: English      needed? No    Call taken on January 19, 2023 at 9:45 AM by Yamini Deluca

## 2023-01-19 NOTE — TELEPHONE ENCOUNTER
[facility] phone call message- patient requesting results:    Test: Lab and Ultrasound    Date of test: 12/26/2022    Additional Comments: NONE    OK to leave a message on voice mail? Yes    Primary language: English      needed? No    Call taken on January 19, 2023 at 9:43 AM by Yamini Deluca

## 2023-01-23 RX ORDER — FUROSEMIDE 20 MG
TABLET ORAL
Qty: 180 TABLET | Refills: 3 | Status: SHIPPED | OUTPATIENT
Start: 2023-01-23 | End: 2023-02-10

## 2023-01-23 RX ORDER — LISINOPRIL 20 MG/1
TABLET ORAL
Qty: 90 TABLET | Refills: 3 | Status: SHIPPED | OUTPATIENT
Start: 2023-01-23 | End: 2023-02-10

## 2023-01-26 ENCOUNTER — OFFICE VISIT (OUTPATIENT)
Dept: FAMILY MEDICINE | Facility: CLINIC | Age: 63
End: 2023-01-26
Payer: MEDICARE

## 2023-01-26 ENCOUNTER — TELEPHONE (OUTPATIENT)
Dept: FAMILY MEDICINE | Facility: CLINIC | Age: 63
End: 2023-01-26

## 2023-01-26 VITALS
HEART RATE: 102 BPM | BODY MASS INDEX: 35.09 KG/M2 | WEIGHT: 266 LBS | RESPIRATION RATE: 16 BRPM | TEMPERATURE: 97.9 F | SYSTOLIC BLOOD PRESSURE: 138 MMHG | DIASTOLIC BLOOD PRESSURE: 83 MMHG | OXYGEN SATURATION: 97 %

## 2023-01-26 DIAGNOSIS — I10 BENIGN ESSENTIAL HYPERTENSION: Primary | ICD-10-CM

## 2023-01-26 DIAGNOSIS — M79.2 CERVICAL SPINE ARTHRITIS WITH NERVE PAIN: ICD-10-CM

## 2023-01-26 DIAGNOSIS — R13.10 DYSPHAGIA, UNSPECIFIED TYPE: ICD-10-CM

## 2023-01-26 DIAGNOSIS — R16.0 HEPATOMEGALY: ICD-10-CM

## 2023-01-26 DIAGNOSIS — R73.9 HYPERGLYCEMIA: ICD-10-CM

## 2023-01-26 DIAGNOSIS — M47.812 CERVICAL SPINE ARTHRITIS WITH NERVE PAIN: ICD-10-CM

## 2023-01-26 PROCEDURE — 99214 OFFICE O/P EST MOD 30 MIN: CPT | Performed by: FAMILY MEDICINE

## 2023-01-26 NOTE — TELEPHONE ENCOUNTER
01/30/23- Xray appt with [facility] @ 9am. Roundtrip scheduled. Medical Cab: TBD.  time: 720am going to [address] [place] [address] with will call for return ride.     02/02/2023- Spine appt @ 720AM. Roundtrip scheduld. Medical Cab: TBD.  time: 630AM going to [address] [place] [address] with will call for return ride.       Osiris Morin

## 2023-01-26 NOTE — PROGRESS NOTES
Assessment & Plan     Benign essential hypertension  BP looking better today  - Comprehensive metabolic panel; Future    Hepatomegaly  Talked about need for more workup  - Adult GI  Referral - Consult Only; Future  - Comprehensive metabolic panel; Future    Cervical spine arthritis with nerve pain    - Spine  Referral; Future    Dysphagia, unspecified type    - XR Video Swallow with SLP or OT - Order with Speech Therapy Referral; Future    Hyperglycemia    - Hemoglobin A1c; Future             Follow up with me in one month    No follow-ups on file.    Reji Naidu MD  [facility]    Ray Orozco is a 62 year old, presenting for the following health issues:  Recheck Medication      HPI     Pt comes in for recheck.  He still feels unwell.  Complains of fullness in abdomen and le swelling.  The swelling is somewhat better with furosemide, he hasn't take his lisinopril.  U/S showed fatty infiltration of the liver with hepatomegaly, and his bloodwork showed elevated liver transminases.  He does have some venous stasis changes in his legs.  He continues to be dyspneic with activity.  He also continues to drink heavily on a daily basis    He continues to have neck pain and dysphagia.  His last neck surgery was 1.5 years ago and he is dissatisfied with the results.  He has trouble swallowing any solids, but not liquids.    His blood sugar was elevated last time, needs diabetes bloodwork    Review of Systems   Constitutional, HEENT, cardiovascular, pulmonary, gi and gu systems are negative, except as otherwise noted.      Objective    /83 (BP Location: Right arm, Patient Position: Sitting, Cuff Size: Adult Large)   Pulse 102   Temp 97.9  F (36.6  C) (Oral)   Resp 16   Wt 120.7 kg (266 lb)   SpO2 97%   BMI 35.09 kg/m    Body mass index is 35.09 kg/m .  Physical Exam   GENERAL: alert, no distress and over weight  EYES: Eyes grossly normal to inspection, PERRL and  conjunctivae and sclerae normal  HENT: ear canals and TM's normal, nose and mouth without ulcers or lesions  NECK: no adenopathy, anterior scar noted, little rom  RESP: lungs clear to auscultation - no rales, rhonchi or wheezes  CV: regular rate and rhythm, normal S1 S2, no S3 or S4, no murmur, click or rub, no peripheral edema and peripheral pulses strong  ABDOMEN: bowel sounds normal and tense distention, hepatomegaly noted on exam, no tenderness or rebound, no fluid wave  MS: tr le edema bilaterally  SKIN: venous stasis dermatitis with some shallow excoriations  NEURO: Normal strength and tone, mentation intact and speech normal  PSYCH: mentation appears normal, affect normal/bright

## 2023-01-26 NOTE — PATIENT INSTRUCTIONS
01/26/23   GASTROENTEROLOGY REFERRAL  [facility]  Phone [phone]  Fax: [phone]    Online referral placed with [facility] who will contact patient to schedule.     Osiris Morin

## 2023-01-30 ENCOUNTER — HOSPITAL ENCOUNTER (OUTPATIENT)
Dept: SPEECH THERAPY | Facility: HOSPITAL | Age: 63
Discharge: HOME | End: 2023-01-30
Attending: FAMILY MEDICINE
Payer: MEDICARE

## 2023-01-30 ENCOUNTER — HOSPITAL ENCOUNTER (OUTPATIENT)
Dept: RADIOLOGY | Facility: HOSPITAL | Age: 63
Discharge: HOME | End: 2023-01-30
Attending: FAMILY MEDICINE
Payer: MEDICARE

## 2023-01-30 DIAGNOSIS — R13.10 DYSPHAGIA, UNSPECIFIED TYPE: ICD-10-CM

## 2023-01-30 PROCEDURE — 74230 X-RAY XM SWLNG FUNCJ C+: CPT

## 2023-01-30 PROCEDURE — 92611 MOTION FLUOROSCOPY/SWALLOW: CPT | Mod: GN

## 2023-01-30 RX ORDER — BARIUM SULFATE 400 MG/ML
SUSPENSION ORAL ONCE
Status: COMPLETED | OUTPATIENT
Start: 2023-01-30 | End: 2023-01-30

## 2023-01-30 RX ADMIN — BARIUM SULFATE 60 ML: 400 SUSPENSION ORAL at 10:00

## 2023-01-30 NOTE — TELEPHONE ENCOUNTER
DIAGNOSIS: Hepatomegaly    Appt Date: 03.13.2023    NOTES STATUS DETAILS   OFFICE NOTE from referring provider Internal 01.26.2023 Reji Naidu MD   OFFICE NOTES from other specialists     DISCHARGE SUMMARY from hospital     MEDICATION LIST Internal / CE    LIVER BIOSPY (IF APPLICABLE)      PATHOLOGY REPORTS      IMAGING     ENDOSCOPY (IF AVAILABLE)     COLONOSCOPY (IF AVAILABLE)     ULTRASOUND LIVER Internal 12.26.2022 US ABDOMEN COMPLETE   CT OF ABDOMEN     MRI OF LIVER     FIBROSCAN, US ELASTOGRAPHY, FIBROSIS SCAN, MR ELASTOGRAPHY     LABS     HEPATIC PANEL (LIVER PANEL)     BASIC METABOLIC PANEL Care Everywhere 01.14.2022   COMPLETE METABOLIC PANEL Care Everywhere 01.14.2022   COMPLETE BLOOD COUNT (CBC) Care Everywhere 01.14.2022   INTERNATIONAL NORMALIZED RATIO (INR) Care Everywhere 05.23.2020   HEPATITIS C ANTIBODY     HEPATITIS C VIRAL LOAD/PCR     HEPATITIS C GENOTYPE     HEPATITIS B SURFACE ANTIGEN     HEPATITIS B SURFACE ANTIBODY     HEPATITIS B DNA QUANT LEVEL     HEPATITIS B CORE ANTIBODY

## 2023-01-30 NOTE — PROGRESS NOTES
"Speech-Language Pathology: OUTPATIENT Video Swallow Study     01/30/23 1100   General Information   Type Of Visit Initial   Start Of Care Date 01/30/23   Referring Physician Dr. Evangelista Mendoza   Orders Evaluate And Treat   Medical Diagnosis s/p cervical spinal fusion with hardware   Onset Of Illness/injury Or Date Of Surgery 01/30/23   Pertinent History of Current Problem/OT: Additional Occupational Profile Info Referral from . Per kelly note dated 1/26/23: Pt comes in for recheck.  He still feels unwell.  Complains of fullness in abdomen and le swelling.  The swelling is somewhat better with furosemide, he hasn't take his lisinopril.  U/S showed fatty infiltration of the liver with hepatomegaly, and his bloodwork showed elevated liver transminases.  He does have some venous stasis changes in his legs.  He continues to be dyspneic with activity.  He also continues to drink heavily on a daily basis     He continues to have neck pain and dysphagia.  His last neck surgery was 1.5 years ago and he is dissatisfied with the results.  He has trouble swallowing any solids, but not liquids.   Respiratory Status Room air   General Information Comments Pt reports difficulty swallowing solids if he does not chew it \"to baby food\" it will be coughed back up again and he re-chews it and swallows it. No issues reported with liquids,   VFSS Eval: Thin Liquid Texture Trial   Mode of Presentation, Thin Liquid cup;self-fed   Order of Presentation 1,2,4,5,7   Preparatory Phase WFL   Oral Phase, Thin Liquid WFL   Pharyngeal Phase, Thin Liquid Delayed swallow reflex;other (see comments)  (level of tip of epiclottis or just past the tip of epiglottis)   Rosenbek's Penetration Aspiration Scale: Thin Liquid Trial Results 2 - contrast enters airway, remains above the vocal cords, no residue remains (penetration)   Successful Strategies Trialed During Procedure, Thin Liquid chin tuck   Diagnostic Statement fairly consistent shallow " to deep trace penetration that appeared to eventually clear.   VFSS Eval: Mildly Thick Liquids    Mode of Presentation self-fed;cup   Order of Presentation 3,8   Preparatory Phase WFL   Oral Phase WFL   Pharyngeal Phase WFL   Rosenbek's Penetration Aspiration Scale 1 - no aspiration, contrast does not enter airway   VFSS Eval: Regular Texture Trial (Solid)   Mode of Presentation self-fed   Order of Presentation 6   Preparatory Phase WFL   Oral Phase WFL   Pharyngeal Phase WFL   Rosenbek's Penetration Aspiration Scale 1 - no aspiration, contrast does not enter airway   Esophageal Phase of Swallow   Patient reports or presents with symptoms of esophageal dysphagia Yes   Esophageal sweep performed during today s vidofluoroscopic exam  Yes;Please refer to radiologist's report for details   Swallow Eval: Clinical Impressions   Skilled Criteria for Therapy Intervention No problems identified which require skilled intervention   Diet texture recommendations Thin liquids (level 0);Regular diet   Clinical Impression Comments Recommend regular diet with thin liquids and for pt to thoroughly chew solids. Small bites. Pt did not have any episode of aspiration or evidence of stasis, even with solids. Pt stated that he chewed it well enough so it wouldn't get stuck. Pt was asymptomatic re: food getting stuck in throat during this assessment. He was instructed to take small bites and chew thoroughly. He did have some mild penetration of thin liquids, though this did not seem to impact health of swallow negatively. No further ST warranted at this time.   Total Session Time   SLP Eval: VideoFluoroscopic Swallow function Minutes ([id number]) 50

## 2023-02-10 ENCOUNTER — TELEPHONE (OUTPATIENT)
Dept: PHYSICAL MEDICINE AND REHAB | Facility: CLINIC | Age: 63
End: 2023-02-10
Payer: MEDICARE

## 2023-02-10 ENCOUNTER — TELEPHONE (OUTPATIENT)
Dept: FAMILY MEDICINE | Facility: CLINIC | Age: 63
End: 2023-02-10

## 2023-02-10 DIAGNOSIS — I10 BENIGN ESSENTIAL HYPERTENSION: ICD-10-CM

## 2023-02-10 DIAGNOSIS — F33.1 MODERATE EPISODE OF RECURRENT MAJOR DEPRESSIVE DISORDER (H): ICD-10-CM

## 2023-02-10 DIAGNOSIS — M47.812 CERVICAL SPINE ARTHRITIS WITH NERVE PAIN: ICD-10-CM

## 2023-02-10 DIAGNOSIS — M10.071 ACUTE IDIOPATHIC GOUT OF RIGHT ANKLE: ICD-10-CM

## 2023-02-10 DIAGNOSIS — M79.2 CERVICAL SPINE ARTHRITIS WITH NERVE PAIN: ICD-10-CM

## 2023-02-10 RX ORDER — LISINOPRIL 20 MG/1
20 TABLET ORAL DAILY
Qty: 90 TABLET | Refills: 3 | Status: SHIPPED | OUTPATIENT
Start: 2023-02-10 | End: 2024-04-22

## 2023-02-10 RX ORDER — IBUPROFEN 800 MG/1
800 TABLET, FILM COATED ORAL EVERY 8 HOURS PRN
Qty: 90 TABLET | Refills: 1 | Status: SHIPPED | OUTPATIENT
Start: 2023-02-10 | End: 2024-04-22

## 2023-02-10 RX ORDER — FUROSEMIDE 20 MG
20 TABLET ORAL 2 TIMES DAILY
Qty: 180 TABLET | Refills: 3 | Status: SHIPPED | OUTPATIENT
Start: 2023-02-10 | End: 2023-03-10

## 2023-02-10 RX ORDER — LIDOCAINE/PRILOCAINE 2.5 %-2.5%
CREAM (GRAM) TOPICAL ONCE
Qty: 5800 G | Refills: 0 | Status: SHIPPED | OUTPATIENT
Start: 2023-02-10 | End: 2024-04-22

## 2023-02-10 RX ORDER — DULOXETIN HYDROCHLORIDE 60 MG/1
CAPSULE, DELAYED RELEASE ORAL
Qty: 60 CAPSULE | Refills: 11 | Status: SHIPPED | OUTPATIENT
Start: 2023-02-10 | End: 2024-04-22

## 2023-02-10 RX ORDER — GABAPENTIN 300 MG/1
CAPSULE ORAL
Qty: 210 CAPSULE | Refills: 3 | Status: SHIPPED | OUTPATIENT
Start: 2023-02-10 | End: 2024-04-22

## 2023-02-10 RX ORDER — ALLOPURINOL 100 MG/1
100 TABLET ORAL DAILY
Qty: 30 TABLET | Refills: 11 | Status: SHIPPED | OUTPATIENT
Start: 2023-02-10 | End: 2024-04-22

## 2023-02-10 NOTE — TELEPHONE ENCOUNTER
"02/10/23    Received call from call center regarding patient upset about his medications not being refilled. He is complaining of his stomach and feet swelling. He states he's seen a few providers including Dr. Naidu and yet his medications have not been addressed. He states he is in the process of looking for a new provider, but hasn't been able to find any new providers and that is why he is still coming to [facility]. He states he feels his care is not taken seriously here and he feels everyone here at [facility] is incompetent. He continued by stating he will be talking with his  if his medications are not refilled by today.     Apologized to patient for his experience and asked patient to list his medications that he feels he should be on and will address it with his provider.     Allopurinol  Methylprednisolone  Ketorolac  Duloxetine  Banophen  Gabapentin  Oxycodone - Received after his surgery  Acetaminophen  Trazaone  Tizanidine  Vitamin D2  Ibuprofen  Furosemide  Lidocream    Patient stated again he needs the above medications to be addressed today or he will be contacting his .   Writer apologized to patient stating, \"I'm sorry you feel you have not been receiving the best care here. I will talk with your provider to see if these medications can be refilled. In the meantime, can I help get you connected with your insurance to help you find a new provider in your network?\"     Patient replied, \"First I need my medications filled. And don't ask me such a stupid questions like that. Did I ask for you help? I only asked for my medications. You guys are all incompetent and do not talk to me unless you are letting me know that my medications have been sent to the pharmacy.\" and patient disconnect call.     Mirella Monaco on 2/10/2023 at 11:06 AM    "

## 2023-02-10 NOTE — TELEPHONE ENCOUNTER
Thank you Dr. Naidu.     Write left message for patient regarding refills.    If patient return calls, please route call to Mirella.     Mirella Monaco on 2/10/2023 at 2:08 PM

## 2023-02-10 NOTE — TELEPHONE ENCOUNTER
I can fill most of those medicines except the tizanidine, oxycodone, and methylprednisolone which we haven't prescribed in the past.  They likely are from his surgeon's office and he'll have to talk to them.

## 2023-02-20 ENCOUNTER — OFFICE VISIT (OUTPATIENT)
Dept: PHYSICAL MEDICINE AND REHAB | Facility: CLINIC | Age: 63
End: 2023-02-20
Payer: MEDICARE

## 2023-02-20 VITALS
HEIGHT: 73 IN | DIASTOLIC BLOOD PRESSURE: 93 MMHG | SYSTOLIC BLOOD PRESSURE: 137 MMHG | HEART RATE: 120 BPM | WEIGHT: 264.2 LBS | BODY MASS INDEX: 35.02 KG/M2

## 2023-02-20 DIAGNOSIS — F32.1 CURRENT MODERATE EPISODE OF MAJOR DEPRESSIVE DISORDER WITHOUT PRIOR EPISODE (H): ICD-10-CM

## 2023-02-20 DIAGNOSIS — F10.10 ETOH ABUSE: ICD-10-CM

## 2023-02-20 DIAGNOSIS — M54.2 CERVICALGIA: ICD-10-CM

## 2023-02-20 DIAGNOSIS — Z98.1 HISTORY OF FUSION OF CERVICAL SPINE: ICD-10-CM

## 2023-02-20 DIAGNOSIS — M47.816 LUMBAR FACET ARTHROPATHY: Primary | ICD-10-CM

## 2023-02-20 PROCEDURE — 99214 OFFICE O/P EST MOD 30 MIN: CPT | Performed by: PHYSICIAN ASSISTANT

## 2023-02-20 RX ORDER — GABAPENTIN 300 MG/1
1 CAPSULE ORAL 3 TIMES DAILY
COMMUNITY
Start: 2021-11-22 | End: 2024-04-22

## 2023-02-20 ASSESSMENT — PAIN SCALES - GENERAL: PAINLEVEL: SEVERE PAIN (7)

## 2023-02-20 NOTE — LETTER
2/20/2023         RE: Ernesto Gomez  [address]  [place] [address]        Dear Colleague,    Thank you for referring your patient, Ernesto Gomez, to the [facility]. Please see a copy of my visit note below.    Assessment:   Ernesto Gomez is a 62 year old y.o. male with past medical history significant for cocaine addiction, COPD, GERD, obesity, tobacco abuse, EtOH abuse who presents today for follow-up regarding neck pain and low back pain.  1.  Chronic bilateral neck pain with associated headaches.  Patient has a history of a C5-C7 ACDF in 2011 and then extension of his fusion from C3-C5 2021 with Dr. Singh.  Patient reports that he has had ongoing neck pain ever since surgery.  He has not had any postoperative MRI scans.   Pain appears to be related to cervical facet arthropathy and secondary myofascial pain.  He also has some tenderness to palpation bilateral occipital nerves suggesting bilateral occipital neuralgia.  2.  Chronic bilateral low back pain without radicular symptoms.  My review of an MRI lumbar spine from [facility] dated June 5, 2021 shows multilevel degenerative change.  Pain seems most consistent with lumbar facet arthropathy.  He had reproduction pain with lumbar facet loading maneuvers bilaterally.  - Patient is neurologically intact on exam.       Plan:     A shared decision making plan was used.  The patient's values and choices were respected.  The following represents what was discussed and decided upon by the physician assistant and the patient.      1.  DIAGNOSTIC TESTS:  - I reviewed the MRI lumbar spine from June 5, 2021.  - I also reviewed the preoperative MRI cervical spine from May 2021.  -I ordered an MRI cervical spine with and without contrast for further evaluation.    2.  PHYSICAL THERAPY: I entered a referral for physical therapy.    3.  MEDICATIONS:  No changes are made to the patient's medications.  - I do not  feel comfortable prescribing opiates or other sedating medications for this patient with history of polysubstance abuse.  He has ongoing alcohol use disorder.  He has a history of gabapentin overdose.  - Patient ran out of his prescription for duloxetine but will pick it up for 60 mg daily.  - Patient takes gabapentin 600 mg in the morning, 600 mg in the afternoon, and 900 mg at bedtime.      4.  INTERVENTIONS:  - I recommend moving forward with bilateral L4, L5 medial branch blocks as work-up toward radiofrequency ablation for his axial low back pain.  - If he fails the bilateral L4, L5 medial branch blocks I may add to the bilateral L3 medial branches.  - We could consider interventional pain management for his cervical spine as well, depending on the results of his MRI.    5.  PATIENT EDUCATION: Patient is in agreement the above plan.  All questions were answered.  - Patient continues to complain of difficulty swallowing ever since his cervical spine surgery.  He states that pills get stuck.  He had a swallow study January 30, 2023 and they recommended regular diet and thin liquids with no further speech therapy.    6.  REFERRALS: Patient states that he is a heavy drinker.  He states he drinks to manage pain.  I entered a referral to behavioral health/chemical dependency.    7.  FOLLOW-UP: Patient will return to the clinic for his bilateral L4, L5 medial branch blocks.  If he has questions or concerns in the meantime, he should not hesitate to call.    Subjective:     Ernesto HACKETT Yousifjames is a 62 year old male who presents today for follow-up regarding chronic neck and back pain.    Patient complains of bilateral neck pain.  Pain radiates up into the head causing headaches.  He denies any pain down the arms.  Neck pain is aggravated with coughing and any exertion.  He states alcohol alleviates his pain.  He states he feels like his neck muscles are very tight.  He has restricted range of motion.    Patient also  complains of bilateral low back pain.  Pain spans across low back in a broadband distribution.  Pain is equally severe on both sides.  He denies pain down the legs.  Pain is aggravated bending, picking up items from the floor, transitioning from sitting to standing, and getting out of bed.  Pain is alleviated with drinking alcohol.  He states he is a heavy drinker to help manage the pain.    Treatment to date:  - Physical therapy for his neck after surgery in 2021  - No physical therapy for lower back  - 1 chiropractic treatment 1 year ago with some relief  - Duloxetine 60 mg daily.  Patient ran out but intends to  his prescription.  -Patient takes gabapentin which is somewhat helpful.    Review of Systems:  Positive for numbness/tingling, weakness, headache, difficulty swallowing.  Negative for loss of bowel/bladder control, inability to urinate, pain much worse at night, trip/stumble/falls, difficulty with hand skills, fevers, unintentional weight loss.     Objective:   CONSTITUTIONAL:  Vital signs as above.  Patient appears anxious.  He is tearful during the visit.  The patient is well nourished and well groomed.    PSYCHIATRIC:  The patient is awake, alert, oriented to person, place and time.  The patient is answering questions appropriately with clear speech.  Normal affect.  HEENT: Normocephalic, atraumatic.  Sclera clear.    LYMPH: No cervical lymphadenopathy  SKIN:  Skin over the face, neck bilateral upper extremities is clean, dry, intact without rashes.  MUSCULOSKELETAL: Patient ambulates with a nonantalgic gait.  He is able to ambulate on toes and heels bilaterally.  The patient has 5/5 strength for the bilateral shoulder abductors, elbow flexors/extensors, wrist extensors, finger flexors/abductors, hip flexors, knee flexors/extensors, ankle dorsi/plantar flexors.  No significant tenderness palpation cervical paraspinous muscles.  Hypertonicity bilateral upper trapezius muscles.  Tender to  palpation bilateral occipital nerves.  Tender to palpation bilateral lower lumbar paraspinous muscles most pronounced at L5-S1.  Lumbar facet loading maneuvers reproduce low back pain bilaterally.  NEUROLOGICAL:    Negative Camargo's bilaterally.  Sensation to light touch is intact bilateral upper and lower extremities throughout.    RESULTS:    I reviewed the MRI lumbar spine from [facility] dated June 5, 2021.  At L5-S1 there is severe disc degeneration and right hypertrophic facet joint degeneration with moderate right and mild left foraminal stenosis.  At L4-5 there is a right foraminal disc extrusion impinging the right L4 ganglion.  There is also increased circumferential epidural fat constricting the dural sac.  Please see report for further details.     I reviewed the MRI cervical spine from [facility] dated May 27, 2021 (preoperative).  This shows active right C4-5 facet joint osteoarthropathy.  There is grade 1 spondylolisthesis C7-T1 without central canal stenosis.  There is hypertrophic facet degeneration on the left at C2-3 and C3-4.  There is severe left C3-4 foraminal stenosis.  There is a solid fusion C5-C7.           Again, thank you for allowing me to participate in the care of your patient.        Sincerely,        Sarah Reddy PA-C

## 2023-02-20 NOTE — PROGRESS NOTES
Assessment:   Ernesto Gomez is a 62 year old y.o. male with past medical history significant for cocaine addiction, COPD, GERD, obesity, tobacco abuse, EtOH abuse who presents today for follow-up regarding neck pain and low back pain.  1.  Chronic bilateral neck pain with associated headaches.  Patient has a history of a C5-C7 ACDF in 2011 and then extension of his fusion from C3-C5 2021 with Dr. Singh.  Patient reports that he has had ongoing neck pain ever since surgery.  He has not had any postoperative MRI scans.   Pain appears to be related to cervical facet arthropathy and secondary myofascial pain.  He also has some tenderness to palpation bilateral occipital nerves suggesting bilateral occipital neuralgia.  2.  Chronic bilateral low back pain without radicular symptoms.  My review of an MRI lumbar spine from [facility] dated June 5, 2021 shows multilevel degenerative change.  Pain seems most consistent with lumbar facet arthropathy.  He had reproduction pain with lumbar facet loading maneuvers bilaterally.  - Patient is neurologically intact on exam.       Plan:     A shared decision making plan was used.  The patient's values and choices were respected.  The following represents what was discussed and decided upon by the physician assistant and the patient.      1.  DIAGNOSTIC TESTS:  - I reviewed the MRI lumbar spine from June 5, 2021.  - I also reviewed the preoperative MRI cervical spine from May 2021.  -I ordered an MRI cervical spine with and without contrast for further evaluation.    2.  PHYSICAL THERAPY: I entered a referral for physical therapy.    3.  MEDICATIONS:  No changes are made to the patient's medications.  - I do not feel comfortable prescribing opiates or other sedating medications for this patient with history of polysubstance abuse.  He has ongoing alcohol use disorder.  He has a history of gabapentin overdose.  - Patient ran out of his prescription for duloxetine but will  pick it up for 60 mg daily.  - Patient takes gabapentin 600 mg in the morning, 600 mg in the afternoon, and 900 mg at bedtime.      4.  INTERVENTIONS:  - I recommend moving forward with bilateral L4, L5 medial branch blocks as work-up toward radiofrequency ablation for his axial low back pain.  - If he fails the bilateral L4, L5 medial branch blocks I may add to the bilateral L3 medial branches.  - We could consider interventional pain management for his cervical spine as well, depending on the results of his MRI.    5.  PATIENT EDUCATION: Patient is in agreement the above plan.  All questions were answered.  - Patient continues to complain of difficulty swallowing ever since his cervical spine surgery.  He states that pills get stuck.  He had a swallow study January 30, 2023 and they recommended regular diet and thin liquids with no further speech therapy.    6.  REFERRALS: Patient states that he is a heavy drinker.  He states he drinks to manage pain.  I entered a referral to behavioral health/chemical dependency.    7.  FOLLOW-UP: Patient will return to the clinic for his bilateral L4, L5 medial branch blocks.  If he has questions or concerns in the meantime, he should not hesitate to call.    Subjective:     Ernesto Gomez is a 62 year old male who presents today for follow-up regarding chronic neck and back pain.    Patient complains of bilateral neck pain.  Pain radiates up into the head causing headaches.  He denies any pain down the arms.  Neck pain is aggravated with coughing and any exertion.  He states alcohol alleviates his pain.  He states he feels like his neck muscles are very tight.  He has restricted range of motion.    Patient also complains of bilateral low back pain.  Pain spans across low back in a broadband distribution.  Pain is equally severe on both sides.  He denies pain down the legs.  Pain is aggravated bending, picking up items from the floor, transitioning from sitting to standing,  and getting out of bed.  Pain is alleviated with drinking alcohol.  He states he is a heavy drinker to help manage the pain.    Treatment to date:  - Physical therapy for his neck after surgery in 2021  - No physical therapy for lower back  - 1 chiropractic treatment 1 year ago with some relief  - Duloxetine 60 mg daily.  Patient ran out but intends to  his prescription.  -Patient takes gabapentin which is somewhat helpful.    Review of Systems:  Positive for numbness/tingling, weakness, headache, difficulty swallowing.  Negative for loss of bowel/bladder control, inability to urinate, pain much worse at night, trip/stumble/falls, difficulty with hand skills, fevers, unintentional weight loss.     Objective:   CONSTITUTIONAL:  Vital signs as above.  Patient appears anxious.  He is tearful during the visit.  The patient is well nourished and well groomed.    PSYCHIATRIC:  The patient is awake, alert, oriented to person, place and time.  The patient is answering questions appropriately with clear speech.  Normal affect.  HEENT: Normocephalic, atraumatic.  Sclera clear.    LYMPH: No cervical lymphadenopathy  SKIN:  Skin over the face, neck bilateral upper extremities is clean, dry, intact without rashes.  MUSCULOSKELETAL: Patient ambulates with a nonantalgic gait.  He is able to ambulate on toes and heels bilaterally.  The patient has 5/5 strength for the bilateral shoulder abductors, elbow flexors/extensors, wrist extensors, finger flexors/abductors, hip flexors, knee flexors/extensors, ankle dorsi/plantar flexors.  No significant tenderness palpation cervical paraspinous muscles.  Hypertonicity bilateral upper trapezius muscles.  Tender to palpation bilateral occipital nerves.  Tender to palpation bilateral lower lumbar paraspinous muscles most pronounced at L5-S1.  Lumbar facet loading maneuvers reproduce low back pain bilaterally.  NEUROLOGICAL:    Negative Camargo's bilaterally.  Sensation to light touch is  intact bilateral upper and lower extremities throughout.    RESULTS:    I reviewed the MRI lumbar spine from [facility] dated June 5, 2021.  At L5-S1 there is severe disc degeneration and right hypertrophic facet joint degeneration with moderate right and mild left foraminal stenosis.  At L4-5 there is a right foraminal disc extrusion impinging the right L4 ganglion.  There is also increased circumferential epidural fat constricting the dural sac.  Please see report for further details.     I reviewed the MRI cervical spine from [facility] dated May 27, 2021 (preoperative).  This shows active right C4-5 facet joint osteoarthropathy.  There is grade 1 spondylolisthesis C7-T1 without central canal stenosis.  There is hypertrophic facet degeneration on the left at C2-3 and C3-4.  There is severe left C3-4 foraminal stenosis.  There is a solid fusion C5-C7.

## 2023-02-20 NOTE — PATIENT INSTRUCTIONS
Medial Branch Block (MBB) TEST    This is a TEST injection to find out what is causing your pain.  Specifically, this test is trying to identify whichjoint in your back or neck is causing pain.   This injection will NOT provide any long term pain relief because it is just a TEST.  Steroid is NOT used for this injection.   Steroid is what gives longterm pain relief.  Since there is no steroid used, there is no long term pain relief.    You need a  for the procedure.    Because we want to determine what is causing your pain, we need you to do a few things on the day of your Medial Branch Block :     Do not take any pain medications on the day of your Medial Branch Block/Test.  Try to do activities that make our pain increase.  We want you tohave a high level of pain on the day of the test.  This makes it easier to know the results of the test.  Other information:    You may eatand drink on the day of the test.  You should take your other medications (just not pain medications) at the times you usually take them  You will be asked to fill out a pain diary for 6 hours after thetest.    AFTER THE TEST:    Please do not take any pain medications for 6 hours after the TEST.  After the pain diary is filled out, youmay resume taking your pain medications.  Please return the pain diary to the [facility] the next day or as soon as you are able.  You will be contacted with what will happen next after the physicianreviews your pain diary.  You may be asked to come in for a follow-up appointment to discuss other treatment options  It may be recommended that you have an injection to help treatyour pain.  You may need to come in for a second set of Medial Branch Blocks (TESTS).        Please call the spine center at [phone] if you have any questions.     An order for physicaltherapy has been provided today.  Someone will call you to schedule physical therapy or you can call [phone] to schedule physical therapy.   It will be very important for you to do your physical therapy exercises on aregular basis to decrease your pain and prevent future flares of pain.

## 2023-02-21 ENCOUNTER — TELEPHONE (OUTPATIENT)
Dept: FAMILY MEDICINE | Facility: CLINIC | Age: 63
End: 2023-02-21
Payer: MEDICARE

## 2023-02-21 NOTE — TELEPHONE ENCOUNTER
02/22/23- Appt @ 1130AM with [facility]. Rountrip setup, cab: TBD going to [address] [address].  time: 10:30-11AM with a will call for return ride home.     02/24/23- Appt @ 1240pm with [facility]. Rountrip setup, cab: TBD going to [address] [facility] [address] [address].  time: 12-12:15pm with a will call for return ride home.     03/09/23- Appt @ 10AM with [facility]. Roundtrip setup, cab: TBD going to [address] [facility] [address] [address]. Pickk up time: 915-925AM with a will call for return ride home.    03/13/23- Appt @ 840AM with [facility]. Roundtrip setup, cab: TBD going to [address] [facility] [address] [address].  time: 750-8AM with a will call for return ride home.       Osiris Morin        
stretcher

## 2023-02-22 ENCOUNTER — HOSPITAL ENCOUNTER (OUTPATIENT)
Dept: PHYSICAL THERAPY | Facility: REHABILITATION | Age: 63
Discharge: HOME | End: 2023-02-22
Attending: PHYSICIAN ASSISTANT
Payer: MEDICARE

## 2023-02-22 DIAGNOSIS — M47.816 LUMBAR FACET ARTHROPATHY: ICD-10-CM

## 2023-02-22 DIAGNOSIS — Z98.1 HISTORY OF FUSION OF CERVICAL SPINE: ICD-10-CM

## 2023-02-22 DIAGNOSIS — M54.2 CERVICALGIA: ICD-10-CM

## 2023-02-22 PROCEDURE — 97162 PT EVAL MOD COMPLEX 30 MIN: CPT | Mod: GP

## 2023-02-22 PROCEDURE — 97110 THERAPEUTIC EXERCISES: CPT | Mod: GP
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
## 2023-02-24 ENCOUNTER — RADIOLOGY INJECTION OFFICE VISIT (OUTPATIENT)
Dept: PHYSICAL MEDICINE AND REHAB | Facility: CLINIC | Age: 63
End: 2023-02-24
Attending: PHYSICIAN ASSISTANT
Payer: MEDICARE

## 2023-02-24 VITALS
OXYGEN SATURATION: 96 % | HEART RATE: 116 BPM | TEMPERATURE: 98.4 F | SYSTOLIC BLOOD PRESSURE: 164 MMHG | RESPIRATION RATE: 16 BRPM | DIASTOLIC BLOOD PRESSURE: 92 MMHG

## 2023-02-24 DIAGNOSIS — M47.816 LUMBAR FACET ARTHROPATHY: ICD-10-CM

## 2023-02-24 PROCEDURE — 64493 INJ PARAVERT F JNT L/S 1 LEV: CPT | Mod: 50 | Performed by: PAIN MEDICINE

## 2023-02-24 RX ORDER — BUPIVACAINE HYDROCHLORIDE 5 MG/ML
INJECTION, SOLUTION EPIDURAL; INTRACAUDAL
Status: COMPLETED | OUTPATIENT
Start: 2023-02-24 | End: 2023-02-24

## 2023-02-24 RX ORDER — LIDOCAINE HYDROCHLORIDE 10 MG/ML
INJECTION, SOLUTION EPIDURAL; INFILTRATION; INTRACAUDAL; PERINEURAL
Status: COMPLETED | OUTPATIENT
Start: 2023-02-24 | End: 2023-02-24

## 2023-02-24 RX ADMIN — LIDOCAINE HYDROCHLORIDE 4 ML: 10 INJECTION, SOLUTION EPIDURAL; INFILTRATION; INTRACAUDAL; PERINEURAL at 12:22

## 2023-02-24 RX ADMIN — BUPIVACAINE HYDROCHLORIDE 2 ML: 5 INJECTION, SOLUTION EPIDURAL; INTRACAUDAL at 12:23

## 2023-02-24 ASSESSMENT — PAIN SCALES - GENERAL
PAINLEVEL: MILD PAIN (3)
PAINLEVEL: EXTREME PAIN (9)

## 2023-02-24 NOTE — PATIENT INSTRUCTIONS
Please complete your pain diary and return the diary to the [facility] Center at your earliest convenience.  The [facility] Center will contact you to schedule your next appointment after your pain diary is reviewed by your doctor.  Thank you.    DISCHARGE INSTRUCTIONS    During office hours (8:00 a.m.- 4:00 p.m.) questions or concerns may be answered  by calling [facility] Center Navigation Nurses at  [phone].  Messages received after hours will be returned the following business day.      In the case of an emergency, please dial 911 or seek assistance at the nearest Emergency Room/Urgent Care facility.     All Patients:  You may experience an increase in your symptoms for the first 2 days, once the numbing medication wears off.    You may resume your regular medication, no pain medication until you have completed your diary    You may shower. No swimming, tub bath or hot tub for 24 hours; remove bandage after 4 hours    Continue your activities that can cause you pain to test the blocks.                         You should not drive for the next 3 to 5 hours (have someone drive you)           POSSIBLE PROCEDURE SIDE EFFECTS  -Call Spine Center if concerned-    Increased Pain  Increased numbness/tingling     Nausea/Vomiting  Bruising/bleeding at site (hematoma)             Swelling at site (edema) Headache  Difficulty walking  Infection        Fever greater than 100.5

## 2023-03-02 ENCOUNTER — TELEPHONE (OUTPATIENT)
Dept: PHYSICAL MEDICINE AND REHAB | Facility: CLINIC | Age: 63
End: 2023-03-02
Payer: MEDICARE

## 2023-03-02 DIAGNOSIS — M47.816 SPONDYLOSIS OF LUMBAR REGION WITHOUT MYELOPATHY OR RADICULOPATHY: Primary | ICD-10-CM

## 2023-03-02 NOTE — TELEPHONE ENCOUNTER
Attempted to call pt with recommendations to move forward with the confirmatory bilateral L4, L5 MBBs. He did not answer his phone and the voicemail box was full so I was unable to leave a message. Order for confirmatory blocks has already been placed. Will attempt to contact again at another time.

## 2023-03-06 NOTE — TELEPHONE ENCOUNTER
Attempted to contact patient, but he was unreachable at this time.  A message was left informing the patient that TRESSA Benson was recommending that he move forward with confirmatory bilateral L4, L5 medial branch blocks given the positive response he had to the initial blocks that were recently completed at the [facility].  The patient was encouraged to either call to further discuss the recommendation or to schedule.

## 2023-03-07 ENCOUNTER — TELEPHONE (OUTPATIENT)
Dept: BEHAVIORAL HEALTH | Facility: CLINIC | Age: 63
End: 2023-03-07

## 2023-03-08 NOTE — TELEPHONE ENCOUNTER
"Reason for call:  911 welfare check  Patient called regarding (reason for call): calling back because he received a letter from us to schedule with [facility]. Pt stated he won't be around long enough for it to help. he denied having SI but he eventually hung up on me. Talked a lot about his many medical issues and what can anyone do for him. \"I need help with all the paperwork.\"  Additional comments: when I tried calling pt back his phone number did not work. Called 911 and had [facility] do a welfare check    Phone number to reach patient:  Cell number on file:    Telephone Information:   Mobile [phone]       Best Time:  ?    Can we leave a detailed message on this number?  YES    Travel screening: Not Applicable  "

## 2023-03-10 DIAGNOSIS — I10 BENIGN ESSENTIAL HYPERTENSION: ICD-10-CM

## 2023-03-10 RX ORDER — FUROSEMIDE 20 MG
TABLET ORAL
Qty: 90 TABLET | Refills: 1 | Status: SHIPPED | OUTPATIENT
Start: 2023-03-10 | End: 2024-04-22

## 2023-03-12 NOTE — PROGRESS NOTES
Patient was unable to connect via video. Will need to reschedule in person. Below is for documentation only.    [facility]  LIVER CLINIC CONSULTATION    REASON FOR CONSULTATION: elevated liver tests  REFERRING PROVIDER: Dr. Reji Naidu, family practive    A/P  Ernesto Gomez is a 62 year old male with elevated liver tests.    Mitzi Erazo MD  Hepatology/Liver Transplant  Medical Director, Liver Transplantation  [facility]  Subjective  Ernesto Gomez is a 62 year old male referred for elevated liver tests    Recent Labs   Lab Test 10/28/22  1120   PROTTOTAL 6.7   ALBUMIN 4.0   BILITOTAL 0.3   ALKPHOS 112   *   *     Recent Labs   Lab Test 06/22/22  0709 07/12/21  1653   WBC  --  8.3   RBC  --  5.39   HGB 15.4 16.2   HCT  --  46.8   MCV  --  87   MCH  --  30.1   MCHC  --  34.6   RDW  --  13.5   PLT  --  259     HCV no record  HBV no record    Iron panel no record  US 12/26/22 diffuse fatty infiltration, hepatomegaly    Risk factors for fatty liver disease:  ETOH use:    Past Medical History:   Diagnosis Date     Alcohol abuse      Cervical disc disease      Cocaine addiction (H)      COPD (chronic obstructive pulmonary disease) (H) 6/15/2022     DJD (degenerative joint disease) 12/13/2006     GERD (gastroesophageal reflux disease)      Tobacco abuse        SHX    No family history on file.

## 2023-03-13 ENCOUNTER — TELEPHONE (OUTPATIENT)
Dept: GASTROENTEROLOGY | Facility: CLINIC | Age: 63
End: 2023-03-13

## 2023-03-13 ENCOUNTER — VIRTUAL VISIT (OUTPATIENT)
Dept: GASTROENTEROLOGY | Facility: CLINIC | Age: 63
End: 2023-03-13
Attending: FAMILY MEDICINE
Payer: MEDICARE

## 2023-03-13 ENCOUNTER — PRE VISIT (OUTPATIENT)
Dept: GASTROENTEROLOGY | Facility: CLINIC | Age: 63
End: 2023-03-13
Payer: MEDICARE

## 2023-03-13 DIAGNOSIS — R16.0 HEPATOMEGALY: ICD-10-CM

## 2023-03-13 ASSESSMENT — PAIN SCALES - GENERAL: PAINLEVEL: MODERATE PAIN (4)

## 2023-03-13 NOTE — PROGRESS NOTES
"Video-Visit Details    Type of service:  Video Visit    Video Start Time (time video started): ***    Video End Time (time video stopped): ***    Originating Location (pt. Location): {video visit patient location:[id number]::\"Home\"}    {PROVIDER LOCATION On-site should be selected for visits conducted from your clinic location or adjoining [facility] hospital, academic office, or other nearby [facility] building. Off-site should be selected for all other provider locations, including home:[id number]}    Distant Location (provider location):  {virtual location provider:774382}    Mode of Communication:  Video Conference via [employer]        "

## 2023-03-13 NOTE — TELEPHONE ENCOUNTER
LVM // per Dr. Mitzi Erazo pt can reschedule appt on 5.16.23 (this appt is rescheduled from missed appt on 3.13.23) to be with any provider - next available // first attempt, AN 3.13.23

## 2023-03-13 NOTE — NURSING NOTE
Is the patient currently in the state of [place]? YES    Visit mode:VIDEO    If the visit is dropped, the patient can be reconnected by: VIDEO VISIT: Text to cell phone: [phone]    Will anyone else be joining the visit? NO      How would you like to obtain your AVS? MyChart    Are changes needed to the allergy or medication list? YES: PT is not quite sure which medications he is taking - seems a bit confused on what they are for and which he should take.  He believes he is taking gabapentin, ciclopirox, ibuprofen PRN, as well as a multivitamin, glucosamine, and using the Lidocaine 2.5% cream.    Reason for visit: Establish care    Cl Mancera

## 2023-03-13 NOTE — LETTER
3/13/2023         RE: Ernesto Gomez  [address]  [place] [address]        Dear Colleague,    Thank you for referring your patient, Ernesto Gomez, to the [facility] [place]. Please see a copy of my visit note below.    Patient was unable to connect via video. Will need to reschedule in person. Below is for documentation only.    [facility]  LIVER CLINIC CONSULTATION    REASON FOR CONSULTATION: elevated liver tests  REFERRING PROVIDER: Dr. Reji Naidu, family practive    A/P  Ernesto Gomez is a 62 year old male with elevated liver tests.    Mitzi Erazo MD  Hepatology/Liver Transplant  Medical Director, Liver Transplantation  [facility]  Subjective  Ernseto Gomez is a 62 year old male referred for elevated liver tests    Recent Labs   Lab Test 10/28/22  1120   PROTTOTAL 6.7   ALBUMIN 4.0   BILITOTAL 0.3   ALKPHOS 112   *   *     Recent Labs   Lab Test 06/22/22  0709 07/12/21  1653   WBC  --  8.3   RBC  --  5.39   HGB 15.4 16.2   HCT  --  46.8   MCV  --  87   MCH  --  30.1   MCHC  --  34.6   RDW  --  13.5   PLT  --  259     HCV no record  HBV no record    Iron panel no record  US 12/26/22 diffuse fatty infiltration, hepatomegaly    Risk factors for fatty liver disease:  ETOH use:    Past Medical History:   Diagnosis Date     Alcohol abuse      Cervical disc disease      Cocaine addiction (H)      COPD (chronic obstructive pulmonary disease) (H) 6/15/2022     DJD (degenerative joint disease) 12/13/2006     GERD (gastroesophageal reflux disease)      Tobacco abuse        SHX    No family history on file.        Again, thank you for allowing me to participate in the care of your patient.        Sincerely,        Mitzi Erazo MD

## 2023-03-28 ENCOUNTER — OFFICE VISIT (OUTPATIENT)
Dept: FAMILY MEDICINE | Facility: CLINIC | Age: 63
End: 2023-03-28
Payer: MEDICARE

## 2023-03-28 ENCOUNTER — TELEPHONE (OUTPATIENT)
Dept: FAMILY MEDICINE | Facility: CLINIC | Age: 63
End: 2023-03-28
Payer: MEDICARE

## 2023-03-28 VITALS
OXYGEN SATURATION: 94 % | WEIGHT: 253.8 LBS | HEIGHT: 73 IN | SYSTOLIC BLOOD PRESSURE: 157 MMHG | TEMPERATURE: 97.7 F | HEART RATE: 111 BPM | DIASTOLIC BLOOD PRESSURE: 106 MMHG | BODY MASS INDEX: 33.64 KG/M2 | RESPIRATION RATE: 20 BRPM

## 2023-03-28 DIAGNOSIS — F41.9 ANXIETY: ICD-10-CM

## 2023-03-28 DIAGNOSIS — L08.9 LOCAL INFECTION OF SKIN AND SUBCUTANEOUS TISSUE: Primary | ICD-10-CM

## 2023-03-28 DIAGNOSIS — F32.9 MAJOR DEPRESSIVE DISORDER WITH CURRENT ACTIVE EPISODE, UNSPECIFIED DEPRESSION EPISODE SEVERITY, UNSPECIFIED WHETHER RECURRENT: ICD-10-CM

## 2023-03-28 DIAGNOSIS — G89.29 OTHER CHRONIC PAIN: ICD-10-CM

## 2023-03-28 PROCEDURE — 99214 OFFICE O/P EST MOD 30 MIN: CPT | Mod: GC

## 2023-03-28 RX ORDER — CEPHALEXIN 500 MG/1
500 CAPSULE ORAL 4 TIMES DAILY
Qty: 28 CAPSULE | Refills: 0 | Status: SHIPPED | OUTPATIENT
Start: 2023-03-28 | End: 2023-04-04

## 2023-03-28 ASSESSMENT — PATIENT HEALTH QUESTIONNAIRE - PHQ9: SUM OF ALL RESPONSES TO PHQ QUESTIONS 1-9: 18
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
## 2023-03-28 NOTE — TELEPHONE ENCOUNTER
Medical Transportation Coordination    Appt Date: 03/31/23  Arrival Time: 920AM  Appt Location & Address: [facility], [address] [facility] [address]    Patient's Phone Number: [phone]  Patient's Pick-up Address: [address]  [place] [address]    Tranportation Name & Phone Number: TBD  Estimated Pick-up Time: 815-830AM    Roundtrip?  Yes, will call for return ride  Patient Notified?  Yes         Osiris Morin

## 2023-03-28 NOTE — TELEPHONE ENCOUNTER
Ede Naidu/RN,     Pt called asking for recommendation regarding pt's right thumb. Per pt, thumb was fracture years ago, unsure of what's going on but right thumb is currently swelling behind the knuckle and nail area. Very tender and painful and when thumb bump into things.     Pt requested for a call back, [phone]. Okay to leave a voicemail.    Please advise.      Thanks,   Osiris Morin

## 2023-03-28 NOTE — PROGRESS NOTES
Preceptor Attestation:    I discussed the patient with the resident and evaluated the patient in person. I have verified the content of the note, which accurately reflects my assessment of the patient and the plan of care.   Supervising Physician:  Hayden Em MD.

## 2023-03-29 ENCOUNTER — TELEPHONE (OUTPATIENT)
Dept: FAMILY MEDICINE | Facility: CLINIC | Age: 63
End: 2023-03-29
Payer: MEDICARE

## 2023-03-29 NOTE — TELEPHONE ENCOUNTER
Per provider's request. Called patient, no answer, left message to call clinic to schedule ofv. Nancy Morin CMA

## 2023-03-30 ENCOUNTER — TELEPHONE (OUTPATIENT)
Dept: PHYSICAL MEDICINE AND REHAB | Facility: CLINIC | Age: 63
End: 2023-03-30
Payer: MEDICARE

## 2023-03-30 NOTE — TELEPHONE ENCOUNTER
Pt is scheduled for MBBs tomorrow, 3-31, but has recently been prescribed antibiotics for a skin infection. LVM for pt to discuss this and likely reschedule injections. Provided Care Keshawn number.

## 2023-03-31 ENCOUNTER — TELEPHONE (OUTPATIENT)
Dept: FAMILY MEDICINE | Facility: CLINIC | Age: 63
End: 2023-03-31

## 2023-03-31 NOTE — TELEPHONE ENCOUNTER
Discussed with Dr. Harley who states pt can be rescheduled for when he would have completed his course of antibiotics.     Discussed with clinic manager who will be calling patient.

## 2023-03-31 NOTE — TELEPHONE ENCOUNTER
Called patient, offer to assist on schedule follow up per Dr. Mendez. Patient's upset stating that none of his need is being met and state that his finger is pop open and did not want to come back for visit. Nancy Morin CMA    Addendum:   Patient using foul word multiple time while stating that his meet is not being met. Prior to hanging up, pt state he should just be his own doc and going to help himself to the liquor store while being upset with this writer for calling. Will check in with patient's pcp for update. Nancy Morin CMA

## 2023-04-06 NOTE — TELEPHONE ENCOUNTER
Called patient today to discuss inappropriate language used with staff. Left him my direct line, but if patient calls back to FD, please forward to me.

## 2023-04-25 ENCOUNTER — TELEPHONE (OUTPATIENT)
Dept: FAMILY MEDICINE | Facility: CLINIC | Age: 63
End: 2023-04-25
Payer: MEDICARE

## 2023-04-25 NOTE — TELEPHONE ENCOUNTER
Medical Transportation Coordination    Appt Date: 05/08/2023  Arrival Time: 2:30pm  Appt Location & Address: [facility], [facility] [place] [address]    Patient's Phone Number: [phone]  Patient's Pick-up Address: [address] [place] [address]. [place] [address]  Is this for a family? No     Tranportation Name & Phone Number: TBD  Estimated Pick-up Time: 1:30pm    Roundtrip?  Yes, will call for return ride  Patient Notified?  Yes           Osiris Morin

## 2023-05-01 DIAGNOSIS — R16.0 HEPATOMEGALY: Primary | ICD-10-CM

## 2023-05-01 DIAGNOSIS — Z11.59 ENCOUNTER FOR SCREENING FOR OTHER VIRAL DISEASES: ICD-10-CM

## 2023-05-07 NOTE — PROGRESS NOTES
Hepatology Clinic note  Ernesto Gomez   Date of Birth 1960  Date of Service 5/7/2023    REASON FOR CONSULTATION:  Elevated LFT's  REFERRING PROVIDER: Reji Naidu MD          Assessment/plan:   Ernesto Gomez is a 62 year old male with history of elevated ALT/AST and imaging findings of hepatic steatosis. Risk factors for fatty liver disease includes: heavy alcohol use, obesity, hypertension, elevated blood glucose labs with likely DM diagnosis. Unclear baseline liver fibrosis today.  He did have an ultrasound 2022 that showed hepatomegaly without ascites or enlarged spleen.  He has complaints of abdominal bloating and early satiety, unclear if this is ascites or worsening of constipation.  No tense ascites at this time.  We will get updated hepatobiliary imaging to help with management of ascites versus constipation.    #Alcohol and metabolic fatty liver disease:  - Will obtain a US elastography to assess baseline fibrosis and to assess for ascites  - Patient has multiple risk factors making them high risk for on-going fibrosis. We discussed the pathophysiology and natural history of nonalcoholic fatty liver disease and cirrhosis.     # History of alcohol use disorder   -Recommend reduction of alcohol use and moving forward with absolute sobriety    # Metabolic fatty liver:   - Recommend slow gradual weight loss.   - Maintain good control of cholesterol (No contraindication to starting a statin with LFT elevations)    # Blood glucose is 447 today  -Hemoglobin A1c is pending.  Instructed patient to schedule follow-up with his PCP given high suspicion for diabetes    # Will also rule out genetic and autoimmune causes of hepatobiliary disease.   Labs: Hep B, Hep C, Iron panel, alpha-1-antitrypsin deficiency, REHAN, F-actin, TTG     -  Follow-up in clinic in 3 months     Colton More PA-C   [facility] Hepatology     -----------------------------------------------------       HPI:   Ernesto HACKETT  Patricia is a 62 year old male  presenting for the evaluation of elevated LFT's.     Appetite is going down. Feels full quickly.  260 lbs, now 230 lbs. Overall, strength feels decreased.     Denies fluid in legs now. Ankle, lungs and stomach. Haven't been taking furosemide in awhile . Drinking a lot of water. Get sweaty a lot. Feels full. Bowel movements are less frequent that previous.    Patient denies jaundice, lower extremity edema, abdominal distension or confusion.  Patient also denies melena, hematochezia or hematemesis. Patient denies fevers,or chills.    PMH:    has a past medical history of Alcohol abuse, Cervical disc disease, Cocaine addiction (H), COPD (chronic obstructive pulmonary disease) (H) (6/15/2022), DJD (degenerative joint disease) (12/13/2006), GERD (gastroesophageal reflux disease), and Tobacco abuse.     SMH:    has a past surgical history that includes Neck surgery (07/13/2021).     Medications:   allopurinol  bismuth subsalicylate  ciclopirox  DULoxetine  furosemide  gabapentin  ibuprofen  lisinopril     Current smoke half pack a day. Have 3-5 beers or pint of whiskey or vodka most days. DWI in 1985. No previous CD treatment. Helps with sleep and pain. 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 No known family history of liver disease or liver cancer.     Previous work-up:   No results found for: HEPBANG, HBCAB, AUSAB, HCVAB, HCVRNA, DESIREE, IRONSAT, TTG, TTGG, IGA, CER, RUIZ, ANAT1, SMOOTHMUS, H8KEGQP, A1A, E3BHGMO, S8TRONH, D3BYVBT, B8WRZSKI, H1QJJAGXY, MITM2, TSH, CHOL, HDL, LDL, TRIG, A1C, POPETH, PLPETH   No results found for: SPECDES, LDRESULTS, LDINTERP, LDMETHOD, LDCOMMENTS, LDDISCLAIMER         Allergies:   No Known Allergies         Social History:     Social History     Socioeconomic History     Marital status: Single     Spouse name: Not on file     Number of children: Not on file     Years of education: Not on file  "    Highest education level: Not on file   Occupational History     Not on file   Tobacco Use     Smoking status: Every Day     Packs/day: 0.50     Types: Cigarettes     Smokeless tobacco: Never   Vaping Use     Vaping status: Not on file   Substance and Sexual Activity     Alcohol use: Not on file     Drug use: Not on file     Sexual activity: Not on file   Other Topics Concern     Not on file   Social History Narrative     Not on file     Social Determinants of Health     Financial Resource Strain: Not on file   Food Insecurity: Not on file   Transportation Needs: Not on file   Physical Activity: Not on file   Stress: Not on file   Social Connections: Not on file   Intimate Partner Violence: Not on file   Housing Stability: Not on file            Family History:   No family history on file.         Review of Systems:   Gen: See HPI     HEENT: No change in vision or hearing, mouth sores, dysphagia, lymph nodes  Resp: No shortness of breath, coughing, hx of asthma  CV: No chest pain, palpitations, syncope   GI: See HPI  : No dysuria, history of stones, urine color    Skin: No rash; no pruritus or psoriasis  MS: No arthralgias, myalgias, joint swelling  Neuro: No memory changes, confusion, numbness    Heme: No difficulty clotting, bruising, bleeding  Psych:  No anxiety, depression, agitation          Physical Exam:   /76 (BP Location: Right arm, Patient Position: Sitting, Cuff Size: Adult Large)   Pulse 118   Temp 97.9  F (36.6  C) (Oral)   Resp 24   Ht 1.854 m (6' 0.99\")   Wt 104.8 kg (231 lb 1.6 oz)   SpO2 96%   BMI 30.50 kg/m      Gen: A&Ox3, NAD, well developed  HEENT: non-icteric   Lym- no palpable lymphadenopathy  Abd: soft, NT, ND, no palpable splenomegaly, liver is not palpable.   Ext: no edema, intact pulses.   Skin: No rash, no palmar erythema, telangiectasias or jaundice  Neuro: grossly intact, no asterixis   Psych: appropriate mood and affects         Data:   Reviewed in person and " significant for:    Lab Results   Component Value Date     10/28/2022      Lab Results   Component Value Date    POTASSIUM 3.9 10/28/2022    POTASSIUM 3.9 04/29/2022     Lab Results   Component Value Date    CHLORIDE 104 10/28/2022    CHLORIDE 105 04/29/2022     Lab Results   Component Value Date    CO2 22 10/28/2022    CO2 22 04/29/2022     Lab Results   Component Value Date    BUN 14.9 10/28/2022    BUN 7 04/29/2022     Lab Results   Component Value Date    CR 1.01 10/28/2022       Lab Results   Component Value Date    WBC 8.3 07/12/2021     Lab Results   Component Value Date    HGB 15.4 06/22/2022     Lab Results   Component Value Date    HCT 46.8 07/12/2021     Lab Results   Component Value Date    MCV 87 07/12/2021     Lab Results   Component Value Date     07/12/2021       Lab Results   Component Value Date     10/28/2022     Lab Results   Component Value Date     10/28/2022     No results found for: BILICONJ   Lab Results   Component Value Date    BILITOTAL 0.3 10/28/2022       Lab Results   Component Value Date    ALBUMIN 4.0 10/28/2022    ALBUMIN 4.0 04/29/2022     Lab Results   Component Value Date    PROTTOTAL 6.7 10/28/2022      Lab Results   Component Value Date    ALKPHOS 112 10/28/2022       Lab Results   Component Value Date    INR 0.99 07/12/2021         Imaging:      EXAM: US ABDOMEN COMPLETE  LOCATION: [facility]  DATE/TIME: 12/26/2022 2:53 PM     INDICATION:  Abdominal distention  COMPARISON: None.  TECHNIQUE: Complete abdominal ultrasound.     FINDINGS:     GALLBLADDER: Normal. No gallstones, wall thickening, or pericholecystic fluid. Negative sonographic Garcia's sign.     BILE DUCTS: No biliary dilatation. The common duct not measured.     LIVER: Normal parenchyma with smooth contour. No focal mass. The liver measures 19.5 cm in size.     RIGHT KIDNEY: Normal size. Normal echogenicity with no hydronephrosis or mass. Limited evaluation. The  cortex measures 1.8 cm.     LEFT KIDNEY: Normal size. Normal echogenicity with no hydronephrosis or mass. Limited evaluation. The cortex measures 2.0 cm.     SPLEEN: Normal. 12.7 cm.     PANCREAS: The pancreas is largely obscured by overlying gas.     AORTA: Normal in caliber.      IVC: Not visualized due to bowel gas.     No ascites.                                                                      IMPRESSION:  Diffuse fatty infiltration of the liver.  Hepatomegaly.  Limited exam due to bowel gas.  1.  Normal complete abdominal ultrasound.

## 2023-05-08 ENCOUNTER — OFFICE VISIT (OUTPATIENT)
Dept: GASTROENTEROLOGY | Facility: CLINIC | Age: 63
End: 2023-05-08
Attending: PHYSICIAN ASSISTANT
Payer: MEDICARE

## 2023-05-08 ENCOUNTER — LAB (OUTPATIENT)
Dept: LAB | Facility: CLINIC | Age: 63
End: 2023-05-08
Payer: MEDICARE

## 2023-05-08 VITALS
HEART RATE: 118 BPM | BODY MASS INDEX: 30.63 KG/M2 | OXYGEN SATURATION: 96 % | SYSTOLIC BLOOD PRESSURE: 117 MMHG | DIASTOLIC BLOOD PRESSURE: 76 MMHG | TEMPERATURE: 97.9 F | HEIGHT: 73 IN | RESPIRATION RATE: 24 BRPM | WEIGHT: 231.1 LBS

## 2023-05-08 DIAGNOSIS — R73.9 ELEVATED RANDOM BLOOD GLUCOSE LEVEL: ICD-10-CM

## 2023-05-08 DIAGNOSIS — R16.0 HEPATOMEGALY: Primary | ICD-10-CM

## 2023-05-08 DIAGNOSIS — R16.0 HEPATOMEGALY: ICD-10-CM

## 2023-05-08 DIAGNOSIS — F10.10 ETOH ABUSE: Primary | ICD-10-CM

## 2023-05-08 DIAGNOSIS — Z11.59 ENCOUNTER FOR SCREENING FOR OTHER VIRAL DISEASES: ICD-10-CM

## 2023-05-08 LAB
ALBUMIN SERPL BCG-MCNC: 4.5 G/DL (ref 3.5–5.2)
ALP SERPL-CCNC: 151 U/L (ref 40–129)
ALT SERPL W P-5'-P-CCNC: 94 U/L (ref 10–50)
ANION GAP SERPL CALCULATED.3IONS-SCNC: 15 MMOL/L (ref 7–15)
AST SERPL W P-5'-P-CCNC: 73 U/L (ref 10–50)
BILIRUB DIRECT SERPL-MCNC: <0.2 MG/DL (ref 0–0.3)
BILIRUB SERPL-MCNC: 0.4 MG/DL
BUN SERPL-MCNC: 7.5 MG/DL (ref 8–23)
CALCIUM SERPL-MCNC: 10.1 MG/DL (ref 8.8–10.2)
CHLORIDE SERPL-SCNC: 98 MMOL/L (ref 98–107)
CREAT SERPL-MCNC: 0.83 MG/DL (ref 0.67–1.17)
DEPRECATED HCO3 PLAS-SCNC: 24 MMOL/L (ref 22–29)
ERYTHROCYTE [DISTWIDTH] IN BLOOD BY AUTOMATED COUNT: 13.2 % (ref 10–15)
GFR SERPL CREATININE-BSD FRML MDRD: >90 ML/MIN/1.73M2
GLUCOSE SERPL-MCNC: 447 MG/DL (ref 70–99)
HBV CORE AB SERPL QL IA: NONREACTIVE
HBV SURFACE AB SERPL IA-ACNC: 0 M[IU]/ML
HBV SURFACE AB SERPL IA-ACNC: NONREACTIVE M[IU]/ML
HBV SURFACE AG SERPL QL IA: NONREACTIVE
HCT VFR BLD AUTO: 48.2 % (ref 40–53)
HCV AB SERPL QL IA: NONREACTIVE
HGB BLD-MCNC: 16.9 G/DL (ref 13.3–17.7)
INR PPP: 0.98 (ref 0.85–1.15)
MCH RBC QN AUTO: 29.8 PG (ref 26.5–33)
MCHC RBC AUTO-ENTMCNC: 35.1 G/DL (ref 31.5–36.5)
MCV RBC AUTO: 85 FL (ref 78–100)
PLATELET # BLD AUTO: 239 10E3/UL (ref 150–450)
POTASSIUM SERPL-SCNC: 4.3 MMOL/L (ref 3.4–5.3)
PROT SERPL-MCNC: 7.9 G/DL (ref 6.4–8.3)
RBC # BLD AUTO: 5.67 10E6/UL (ref 4.4–5.9)
SODIUM SERPL-SCNC: 137 MMOL/L (ref 136–145)
WBC # BLD AUTO: 7.1 10E3/UL (ref 4–11)

## 2023-05-08 PROCEDURE — 99204 OFFICE O/P NEW MOD 45 MIN: CPT | Performed by: PHYSICIAN ASSISTANT

## 2023-05-08 PROCEDURE — 83036 HEMOGLOBIN GLYCOSYLATED A1C: CPT | Performed by: PHYSICIAN ASSISTANT

## 2023-05-08 PROCEDURE — 87340 HEPATITIS B SURFACE AG IA: CPT | Performed by: PHYSICIAN ASSISTANT

## 2023-05-08 PROCEDURE — 36415 COLL VENOUS BLD VENIPUNCTURE: CPT | Performed by: PATHOLOGY

## 2023-05-08 PROCEDURE — 86704 HEP B CORE ANTIBODY TOTAL: CPT | Performed by: PHYSICIAN ASSISTANT

## 2023-05-08 PROCEDURE — 82248 BILIRUBIN DIRECT: CPT | Performed by: PATHOLOGY

## 2023-05-08 PROCEDURE — 86706 HEP B SURFACE ANTIBODY: CPT | Performed by: PHYSICIAN ASSISTANT

## 2023-05-08 PROCEDURE — 85027 COMPLETE CBC AUTOMATED: CPT | Performed by: PATHOLOGY

## 2023-05-08 PROCEDURE — 86803 HEPATITIS C AB TEST: CPT | Performed by: PHYSICIAN ASSISTANT

## 2023-05-08 PROCEDURE — 85610 PROTHROMBIN TIME: CPT | Performed by: PATHOLOGY

## 2023-05-08 PROCEDURE — 80053 COMPREHEN METABOLIC PANEL: CPT | Performed by: PATHOLOGY

## 2023-05-08 PROCEDURE — G0463 HOSPITAL OUTPT CLINIC VISIT: HCPCS | Performed by: PHYSICIAN ASSISTANT

## 2023-05-08 ASSESSMENT — PAIN SCALES - GENERAL: PAINLEVEL: MODERATE PAIN (5)

## 2023-05-08 NOTE — NURSING NOTE
"Chief Complaint   Patient presents with     Consult     Fatty liver and hepatomegaly per ultrasound report      Vital signs:  Temp: 97.9  F (36.6  C) Temp src: Oral BP: 117/76 Pulse: 118   Resp: 24 SpO2: 96 %     Height: 185.4 cm (6' 0.99\") Weight: 104.8 kg (231 lb 1.6 oz)  Estimated body mass index is 30.5 kg/m  as calculated from the following:    Height as of this encounter: 1.854 m (6' 0.99\").    Weight as of this encounter: 104.8 kg (231 lb 1.6 oz).        Eva Lange, [facility]  5/8/2023 3:32 PM      "

## 2023-05-08 NOTE — LETTER
5/8/2023         RE: Erensto Gomez  [address]  [place] [address]        Dear Colleague,    Thank you for referring your patient, Ernesto Gomez, to the [facility] [place]. Please see a copy of my visit note below.    Hepatology Clinic note  Ernesto Gomez   Date of Birth 1960  Date of Service 5/7/2023    REASON FOR CONSULTATION:  Elevated LFT's  REFERRING PROVIDER: Reji Naidu MD          Assessment/plan:   Ernesto Gomez is a 62 year old male with history of elevated ALT/AST and imaging findings of hepatic steatosis. Risk factors for fatty liver disease includes: heavy alcohol use, obesity, hypertension, elevated blood glucose labs with likely DM diagnosis. Unclear baseline liver fibrosis today.  He did have an ultrasound 2022 that showed hepatomegaly without ascites or enlarged spleen.  He has complaints of abdominal bloating and early satiety, unclear if this is ascites or worsening of constipation.  No tense ascites at this time.  We will get updated hepatobiliary imaging to help with management of ascites versus constipation.    #Alcohol and metabolic fatty liver disease:  - Will obtain a US elastography to assess baseline fibrosis and to assess for ascites  - Patient has multiple risk factors making them high risk for on-going fibrosis. We discussed the pathophysiology and natural history of nonalcoholic fatty liver disease and cirrhosis.     # History of alcohol use disorder   -Recommend reduction of alcohol use and moving forward with absolute sobriety    # Metabolic fatty liver:   - Recommend slow gradual weight loss.   - Maintain good control of cholesterol (No contraindication to starting a statin with LFT elevations)    # Blood glucose is 447 today  -Hemoglobin A1c is pending.  Instructed patient to schedule follow-up with his PCP given high suspicion for diabetes    # Will also rule out genetic and autoimmune causes of hepatobiliary  disease.   Labs: Hep B, Hep C, Iron panel, alpha-1-antitrypsin deficiency, REHAN, F-actin, TTG     -  Follow-up in clinic in 3 months     Colton More PA-C   [facility] Hepatology     -----------------------------------------------------       HPI:   Ernesto Gomez is a 62 year old male  presenting for the evaluation of elevated LFT's.     Appetite is going down. Feels full quickly.  260 lbs, now 230 lbs. Overall, strength feels decreased.     Denies fluid in legs now. Ankle, lungs and stomach. Haven't been taking furosemide in awhile . Drinking a lot of water. Get sweaty a lot. Feels full. Bowel movements are less frequent that previous.    Patient denies jaundice, lower extremity edema, abdominal distension or confusion.  Patient also denies melena, hematochezia or hematemesis. Patient denies fevers,or chills.    PMH:    has a past medical history of Alcohol abuse, Cervical disc disease, Cocaine addiction (H), COPD (chronic obstructive pulmonary disease) (H) (6/15/2022), DJD (degenerative joint disease) (12/13/2006), GERD (gastroesophageal reflux disease), and Tobacco abuse.     SMH:    has a past surgical history that includes Neck surgery (07/13/2021).     Medications:   allopurinol  bismuth subsalicylate  ciclopirox  DULoxetine  furosemide  gabapentin  ibuprofen  lisinopril     Current smoke half pack a day. Have 3-5 beers or pint of whiskey or vodka most days. DWI in 1985. No previous CD treatment. Helps with sleep and pain. 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 No known family history of liver disease or liver cancer.     Previous work-up:   No results found for: HEPBANG, HBCAB, AUSAB, HCVAB, HCVRNA, DESIREE, IRONSAT, TTG, TTGG, IGA, CER, RUIZ, ANAT1, SMOOTHMUS, C4LYKBT, A1A, Y9ULKNU, L4CTPKN, U3OZABK, D8SKHMKU, K0TGGFFAK, MITM2, TSH, CHOL, HDL, LDL, TRIG, A1C, POPETH, PLPETH   No results found for: SPECDES, LDRESULTS, LDINTERP,  "LDMETHOD, LDCOMMENTS, LDDISCLAIMER         Allergies:   No Known Allergies         Social History:     Social History     Socioeconomic History    Marital status: Single     Spouse name: Not on file    Number of children: Not on file    Years of education: Not on file    Highest education level: Not on file   Occupational History    Not on file   Tobacco Use    Smoking status: Every Day     Packs/day: 0.50     Types: Cigarettes    Smokeless tobacco: Never   Vaping Use    Vaping status: Not on file   Substance and Sexual Activity    Alcohol use: Not on file    Drug use: Not on file    Sexual activity: Not on file   Other Topics Concern    Not on file   Social History Narrative    Not on file     Social Determinants of Health     Financial Resource Strain: Not on file   Food Insecurity: Not on file   Transportation Needs: Not on file   Physical Activity: Not on file   Stress: Not on file   Social Connections: Not on file   Intimate Partner Violence: Not on file   Housing Stability: Not on file            Family History:   No family history on file.         Review of Systems:   Gen: See HPI     HEENT: No change in vision or hearing, mouth sores, dysphagia, lymph nodes  Resp: No shortness of breath, coughing, hx of asthma  CV: No chest pain, palpitations, syncope   GI: See HPI  : No dysuria, history of stones, urine color    Skin: No rash; no pruritus or psoriasis  MS: No arthralgias, myalgias, joint swelling  Neuro: No memory changes, confusion, numbness    Heme: No difficulty clotting, bruising, bleeding  Psych:  No anxiety, depression, agitation          Physical Exam:   /76 (BP Location: Right arm, Patient Position: Sitting, Cuff Size: Adult Large)   Pulse 118   Temp 97.9  F (36.6  C) (Oral)   Resp 24   Ht 1.854 m (6' 0.99\")   Wt 104.8 kg (231 lb 1.6 oz)   SpO2 96%   BMI 30.50 kg/m      Gen: A&Ox3, NAD, well developed  HEENT: non-icteric   Lym- no palpable lymphadenopathy  Abd: soft, NT, ND, no " palpable splenomegaly, liver is not palpable.   Ext: no edema, intact pulses.   Skin: No rash, no palmar erythema, telangiectasias or jaundice  Neuro: grossly intact, no asterixis   Psych: appropriate mood and affects         Data:   Reviewed in person and significant for:    Lab Results   Component Value Date     10/28/2022      Lab Results   Component Value Date    POTASSIUM 3.9 10/28/2022    POTASSIUM 3.9 04/29/2022     Lab Results   Component Value Date    CHLORIDE 104 10/28/2022    CHLORIDE 105 04/29/2022     Lab Results   Component Value Date    CO2 22 10/28/2022    CO2 22 04/29/2022     Lab Results   Component Value Date    BUN 14.9 10/28/2022    BUN 7 04/29/2022     Lab Results   Component Value Date    CR 1.01 10/28/2022       Lab Results   Component Value Date    WBC 8.3 07/12/2021     Lab Results   Component Value Date    HGB 15.4 06/22/2022     Lab Results   Component Value Date    HCT 46.8 07/12/2021     Lab Results   Component Value Date    MCV 87 07/12/2021     Lab Results   Component Value Date     07/12/2021       Lab Results   Component Value Date     10/28/2022     Lab Results   Component Value Date     10/28/2022     No results found for: BILICONJ   Lab Results   Component Value Date    BILITOTAL 0.3 10/28/2022       Lab Results   Component Value Date    ALBUMIN 4.0 10/28/2022    ALBUMIN 4.0 04/29/2022     Lab Results   Component Value Date    PROTTOTAL 6.7 10/28/2022      Lab Results   Component Value Date    ALKPHOS 112 10/28/2022       Lab Results   Component Value Date    INR 0.99 07/12/2021         Imaging:      EXAM: US ABDOMEN COMPLETE  LOCATION: [facility]  DATE/TIME: 12/26/2022 2:53 PM     INDICATION:  Abdominal distention  COMPARISON: None.  TECHNIQUE: Complete abdominal ultrasound.     FINDINGS:     GALLBLADDER: Normal. No gallstones, wall thickening, or pericholecystic fluid. Negative sonographic Garcia's sign.     BILE DUCTS: No  biliary dilatation. The common duct not measured.     LIVER: Normal parenchyma with smooth contour. No focal mass. The liver measures 19.5 cm in size.     RIGHT KIDNEY: Normal size. Normal echogenicity with no hydronephrosis or mass. Limited evaluation. The cortex measures 1.8 cm.     LEFT KIDNEY: Normal size. Normal echogenicity with no hydronephrosis or mass. Limited evaluation. The cortex measures 2.0 cm.     SPLEEN: Normal. 12.7 cm.     PANCREAS: The pancreas is largely obscured by overlying gas.     AORTA: Normal in caliber.      IVC: Not visualized due to bowel gas.     No ascites.                                                                      IMPRESSION:  Diffuse fatty infiltration of the liver.  Hepatomegaly.  Limited exam due to bowel gas.  1.  Normal complete abdominal ultrasound.      Colton More PA-C

## 2023-05-09 ENCOUNTER — TELEPHONE (OUTPATIENT)
Dept: GASTROENTEROLOGY | Facility: CLINIC | Age: 63
End: 2023-05-09
Payer: MEDICARE

## 2023-05-09 LAB — HBA1C MFR BLD: 12.5 %

## 2023-05-09 NOTE — TELEPHONE ENCOUNTER
"Called patient per Colton More PA-C.    Informed patient:  - blood glucose is very elevated, bordering on dangerously high  - Colton recommends you see your primary care clinic within the next few weeks to discuss diagnosis and likely starting a medication    Patient reported:  Positive for: mild symptoms of confusion, headache present for \"4 years\", dizziness/ increasing pain with coughing that keeps him from driving, weight loss without food intake as he has no desire to eat, nausea, disorientation, sweating when it's \"50 degrees out\" and feeling feverish at times.     Patient disclosed he drinks every day to \"take the edge off\".    Patient reported he has great difficulty bending over to pick things up due to pain in his belly.     Patient reported he is supposed to have a PCA assist him with general housekeeping things but they have not visited him in a long time.    Patient declined a dietician referral    Patient reported he does not see a primary anymore but would be open to finding a new one.     ESTEBAN Cabrera, RN, PHN  Hepatology Clinic  [address] [place] [address]      "
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
## 2023-05-17 NOTE — ADDENDUM NOTE
Encounter addended by: Misty Hagen, PT on: 5/17/2023 1:08 AM   Actions taken: Clinical Note Signed, Flowsheet accepted, Episode resolved

## 2023-06-28 ENCOUNTER — TELEPHONE (OUTPATIENT)
Dept: FAMILY MEDICINE | Facility: CLINIC | Age: 63
End: 2023-06-28

## 2023-06-28 NOTE — TELEPHONE ENCOUNTER
[facility] phone call message- general phone call:    Reason for call:     Patient received a missed call from us, patient would like a return phone call before his appt to confirm why we called him. Patient will not be coming to his appt if he don't receive a phone call from us before his appt today.     Return call needed: Yes    OK to leave a message on voice mail? Yes    Primary language: English      needed? No    Call taken on June 28, 2023 at 8:49 AM by Eda Benitez

## 2023-06-28 NOTE — TELEPHONE ENCOUNTER
Writer unable to find documentation  in chart as to where the call was from. Patient did no show his appt    Nury Kiran RN on 6/28/2023 at 11:28 AM

## 2023-07-20 ENCOUNTER — RADIOLOGY INJECTION OFFICE VISIT (OUTPATIENT)
Dept: PHYSICAL MEDICINE AND REHAB | Facility: CLINIC | Age: 63
End: 2023-07-20
Payer: MEDICARE

## 2023-07-20 VITALS
HEART RATE: 120 BPM | OXYGEN SATURATION: 93 % | RESPIRATION RATE: 16 BRPM | DIASTOLIC BLOOD PRESSURE: 88 MMHG | TEMPERATURE: 98.5 F | SYSTOLIC BLOOD PRESSURE: 162 MMHG

## 2023-07-20 DIAGNOSIS — M47.816 SPONDYLOSIS OF LUMBAR REGION WITHOUT MYELOPATHY OR RADICULOPATHY: ICD-10-CM

## 2023-07-20 PROCEDURE — 64493 INJ PARAVERT F JNT L/S 1 LEV: CPT | Mod: 50 | Performed by: PAIN MEDICINE

## 2023-07-20 RX ORDER — LIDOCAINE HYDROCHLORIDE 10 MG/ML
INJECTION, SOLUTION EPIDURAL; INFILTRATION; INTRACAUDAL; PERINEURAL
Status: COMPLETED | OUTPATIENT
Start: 2023-07-20 | End: 2023-07-20

## 2023-07-20 RX ADMIN — LIDOCAINE HYDROCHLORIDE 4 ML: 10 INJECTION, SOLUTION EPIDURAL; INFILTRATION; INTRACAUDAL; PERINEURAL at 15:32

## 2023-07-20 ASSESSMENT — PAIN SCALES - GENERAL
PAINLEVEL: NO PAIN (0)
PAINLEVEL: MODERATE PAIN (5)
PAINLEVEL: EXTREME PAIN (9)

## 2023-07-20 NOTE — PATIENT INSTRUCTIONS
Please complete your pain diary and return the diary to the Spine Center at your earliest convenience.  The Sp[facility] Center will contact you to schedule your next appointment after your pain diary is reviewed by your doctor.  Thank you.    DISCHARGE INSTRUCTIONS    During office hours (8:00 a.m.- 4:00 p.m.) questions or concerns may be answered  by calling Spine Center Navigation Nurses at  [phone].  Messages received after hours will be returned the following business day.      In the case of an emergency, please dial 911 or seek assistance at the nearest Emergency Room/Urgent Care facility.     All Patients:  You may experience an increase in your symptoms for the first 2 days, once the numbing medication wears off.    You may resume your regular medication, no pain medication until you have completed your diary    You may shower. No swimming, tub bath or hot tub for 24 hours; remove bandage after 4 hours    Continue your activities that can cause you pain to test the blocks.                         You should not drive for the next 3 to 5 hours (have someone drive you)           POSSIBLE PROCEDURE SIDE EFFECTS  -Call Spine Center if concerned-    Increased Pain  Increased numbness/tingling     Nausea/Vomiting  Bruising/bleeding at site (hematoma)             Swelling at site (edema) Headache  Difficulty walking  Infection        Fever greater than 100.5

## 2023-07-27 ENCOUNTER — TELEPHONE (OUTPATIENT)
Dept: PHYSICAL MEDICINE AND REHAB | Facility: CLINIC | Age: 63
End: 2023-07-27
Payer: MEDICARE

## 2023-07-27 DIAGNOSIS — M47.816 SPONDYLOSIS OF LUMBAR REGION WITHOUT MYELOPATHY OR RADICULOPATHY: Primary | ICD-10-CM

## 2023-07-27 NOTE — TELEPHONE ENCOUNTER
LVM for pt with Sarah's recommendation to move forward with bilateral L4, L5 medial branch RFA. Provided Care Keshawn number for questions. Order placed.  will reach out for scheduling.

## 2023-08-30 ENCOUNTER — RADIOLOGY INJECTION OFFICE VISIT (OUTPATIENT)
Dept: PHYSICAL MEDICINE AND REHAB | Facility: CLINIC | Age: 63
End: 2023-08-30
Attending: PHYSICIAN ASSISTANT
Payer: MEDICARE

## 2023-08-30 VITALS
DIASTOLIC BLOOD PRESSURE: 92 MMHG | SYSTOLIC BLOOD PRESSURE: 166 MMHG | RESPIRATION RATE: 16 BRPM | OXYGEN SATURATION: 97 % | HEART RATE: 106 BPM | TEMPERATURE: 99.1 F

## 2023-08-30 DIAGNOSIS — M47.816 SPONDYLOSIS OF LUMBAR REGION WITHOUT MYELOPATHY OR RADICULOPATHY: ICD-10-CM

## 2023-08-30 PROCEDURE — 64635 DESTROY LUMB/SAC FACET JNT: CPT | Mod: 50 | Performed by: PAIN MEDICINE

## 2023-08-30 RX ORDER — LIDOCAINE HYDROCHLORIDE 10 MG/ML
INJECTION, SOLUTION EPIDURAL; INFILTRATION; INTRACAUDAL; PERINEURAL
Status: COMPLETED | OUTPATIENT
Start: 2023-08-30 | End: 2023-08-30

## 2023-08-30 RX ORDER — BUPIVACAINE HYDROCHLORIDE 2.5 MG/ML
INJECTION, SOLUTION EPIDURAL; INFILTRATION; INTRACAUDAL
Status: COMPLETED | OUTPATIENT
Start: 2023-08-30 | End: 2023-08-30

## 2023-08-30 RX ADMIN — BUPIVACAINE HYDROCHLORIDE 4 ML: 2.5 INJECTION, SOLUTION EPIDURAL; INFILTRATION; INTRACAUDAL at 13:27

## 2023-08-30 RX ADMIN — LIDOCAINE HYDROCHLORIDE 4 ML: 10 INJECTION, SOLUTION EPIDURAL; INFILTRATION; INTRACAUDAL; PERINEURAL at 13:27

## 2023-08-30 ASSESSMENT — PAIN SCALES - GENERAL
PAINLEVEL: NO PAIN (1)
PAINLEVEL: SEVERE PAIN (6)

## 2023-08-30 NOTE — PATIENT INSTRUCTIONS
Follow-up visit with TRESSA Benson in 6 weeks to discuss procedure outcome and determine care plan going forward.      DISCHARGE INSTRUCTIONS    During office hours (8:00 a.m.-4:00 p.m.) questions or concerns may be answered  by calling [facility] Center Navigation Nurses at  [phone].  Messages received after hours will be returned the following business day.      In the case of an emergency,please dial 911 or seek assistance at the nearest Emergency Room/Urgent Care facility.     All Patients:  You may experience an increase in your symptoms forthe first 5-7 days. Soreness may persist during the first few weeks as it takes 4-6 weeks for the nerves to fully heal.    You may use ice on the injection site,as frequently as 20 minutes each hour if needed. It is recommended NOT to apply heat during the first 24 hours.    You may take your pain medicine.    You may continue taking your regular medication.    You may shower. No swimming, tub bath or hot tub for 48hours. This also includes no lotions, ointments or patches to the needle sites as well. You may remove your bandaid/bandage as soon as you are home.    You mayresume light activities, as tolerated.    Resume your usual diet as tolerated.    It is strongly advisedthat you do not drive for 1-3 hours post injection.    If you have had oral sedation:  Do not drive for 8 hours post injection.             POSSIBLE PROCEDURE SIDE EFFECTS    -Call the Spine Center if you are concerned    IncreasedPain           Increased numbness/tingling      Nausea/Vomiting          Bruising/bleeding at site      Redness or swelling  Difficulty walking      Weakness          Fever greater than 100.5

## 2023-11-19 NOTE — TELEPHONE ENCOUNTER
Routing to provider to interpret and advise on US abd result done 12/26/22.     Reyna Morin RN on 1/19/2023 at 10:49 AM     18

## 2023-12-06 NOTE — PROGRESS NOTES
1st call - LMTCB see message below.
RESPIRATORY CARE NOTE     Patient Name: Ernesto Gomez  Today's Date: 6/22/2022     Complete PFT done. Pt performed tests with good effort. Alb neb given. Test results meet ATS criteria. Results scanned into epic. Pt left in no distress.       Patti Mcfadden, RT        
Warm/Dry

## 2024-04-22 ENCOUNTER — OFFICE VISIT (OUTPATIENT)
Dept: FAMILY MEDICINE | Facility: CLINIC | Age: 64
End: 2024-04-22
Payer: COMMERCIAL

## 2024-04-22 VITALS
HEIGHT: 73 IN | TEMPERATURE: 98 F | SYSTOLIC BLOOD PRESSURE: 190 MMHG | BODY MASS INDEX: 28.44 KG/M2 | WEIGHT: 214.6 LBS | OXYGEN SATURATION: 95 % | DIASTOLIC BLOOD PRESSURE: 136 MMHG | HEART RATE: 111 BPM | RESPIRATION RATE: 18 BRPM

## 2024-04-22 DIAGNOSIS — R63.4 WEIGHT LOSS: ICD-10-CM

## 2024-04-22 DIAGNOSIS — R14.0 ABDOMINAL DISTENTION: Primary | ICD-10-CM

## 2024-04-22 DIAGNOSIS — I1A.0 RESISTANT HYPERTENSION: ICD-10-CM

## 2024-04-22 DIAGNOSIS — E11.9 TYPE 2 DIABETES MELLITUS WITHOUT COMPLICATION, WITHOUT LONG-TERM CURRENT USE OF INSULIN (H): ICD-10-CM

## 2024-04-22 DIAGNOSIS — G89.4 CHRONIC PAIN SYNDROME: ICD-10-CM

## 2024-04-22 LAB
ALBUMIN SERPL BCG-MCNC: 4.3 G/DL (ref 3.5–5.2)
ALP SERPL-CCNC: 198 U/L (ref 40–150)
ALT SERPL W P-5'-P-CCNC: ABNORMAL U/L
ANION GAP SERPL CALCULATED.3IONS-SCNC: 15 MMOL/L (ref 7–15)
AST SERPL W P-5'-P-CCNC: ABNORMAL U/L
BILIRUB SERPL-MCNC: 0.8 MG/DL
BUN SERPL-MCNC: 13.6 MG/DL (ref 8–23)
CALCIUM SERPL-MCNC: 9 MG/DL (ref 8.8–10.2)
CHLORIDE SERPL-SCNC: 99 MMOL/L (ref 98–107)
CHOLEST SERPL-MCNC: 358 MG/DL
CREAT SERPL-MCNC: 0.76 MG/DL (ref 0.67–1.17)
DEPRECATED HCO3 PLAS-SCNC: 18 MMOL/L (ref 22–29)
EGFRCR SERPLBLD CKD-EPI 2021: >90 ML/MIN/1.73M2
ERYTHROCYTE [DISTWIDTH] IN BLOOD BY AUTOMATED COUNT: 12.8 % (ref 10–15)
FASTING STATUS PATIENT QL REPORTED: YES
GLUCOSE SERPL-MCNC: 138 MG/DL (ref 70–99)
HBA1C MFR BLD: 5.7 % (ref 0–5.6)
HCT VFR BLD AUTO: 49.9 % (ref 40–53)
HDLC SERPL-MCNC: 12 MG/DL
HGB BLD-MCNC: 18 G/DL (ref 13.3–17.7)
LDLC SERPL CALC-MCNC: ABNORMAL MG/DL
LDLC SERPL DIRECT ASSAY-MCNC: 21 MG/DL
MCH RBC QN AUTO: 31.6 PG (ref 26.5–33)
MCHC RBC AUTO-ENTMCNC: 36.1 G/DL (ref 31.5–36.5)
MCV RBC AUTO: 88 FL (ref 78–100)
NONHDLC SERPL-MCNC: 346 MG/DL
PLATELET # BLD AUTO: 224 10E3/UL (ref 150–450)
POTASSIUM SERPL-SCNC: 4.1 MMOL/L (ref 3.4–5.3)
PROT SERPL-MCNC: 7.7 G/DL (ref 6.4–8.3)
RBC # BLD AUTO: 5.69 10E6/UL (ref 4.4–5.9)
SODIUM SERPL-SCNC: 132 MMOL/L (ref 135–145)
TRIGL SERPL-MCNC: 2556 MG/DL
WBC # BLD AUTO: 7.9 10E3/UL (ref 4–11)

## 2024-04-22 PROCEDURE — 83721 ASSAY OF BLOOD LIPOPROTEIN: CPT | Mod: 59 | Performed by: FAMILY MEDICINE

## 2024-04-22 PROCEDURE — 82040 ASSAY OF SERUM ALBUMIN: CPT | Performed by: FAMILY MEDICINE

## 2024-04-22 PROCEDURE — 84155 ASSAY OF PROTEIN SERUM: CPT | Performed by: FAMILY MEDICINE

## 2024-04-22 PROCEDURE — 80061 LIPID PANEL: CPT | Performed by: FAMILY MEDICINE

## 2024-04-22 PROCEDURE — 99214 OFFICE O/P EST MOD 30 MIN: CPT | Performed by: FAMILY MEDICINE

## 2024-04-22 PROCEDURE — 83036 HEMOGLOBIN GLYCOSYLATED A1C: CPT | Performed by: FAMILY MEDICINE

## 2024-04-22 PROCEDURE — 82247 BILIRUBIN TOTAL: CPT | Performed by: FAMILY MEDICINE

## 2024-04-22 PROCEDURE — 85027 COMPLETE CBC AUTOMATED: CPT | Performed by: FAMILY MEDICINE

## 2024-04-22 PROCEDURE — 84075 ASSAY ALKALINE PHOSPHATASE: CPT | Performed by: FAMILY MEDICINE

## 2024-04-22 PROCEDURE — 80048 BASIC METABOLIC PNL TOTAL CA: CPT | Performed by: FAMILY MEDICINE

## 2024-04-22 PROCEDURE — 36415 COLL VENOUS BLD VENIPUNCTURE: CPT | Performed by: FAMILY MEDICINE

## 2024-04-22 RX ORDER — ONDANSETRON 2 MG/ML
4 INJECTION INTRAMUSCULAR; INTRAVENOUS ONCE
Status: DISCONTINUED | OUTPATIENT
Start: 2024-04-22 | End: 2024-04-22

## 2024-04-22 RX ORDER — RESPIRATORY SYNCYTIAL VIRUS VACCINE 120MCG/0.5
0.5 KIT INTRAMUSCULAR ONCE
Qty: 1 EACH | Refills: 0 | Status: CANCELLED | OUTPATIENT
Start: 2024-04-22 | End: 2024-04-22

## 2024-04-22 RX ORDER — LISINOPRIL 20 MG/1
20 TABLET ORAL DAILY
Qty: 90 TABLET | Refills: 3 | Status: SHIPPED | OUTPATIENT
Start: 2024-04-22

## 2024-04-22 RX ORDER — ONDANSETRON 4 MG/1
4 TABLET, ORALLY DISINTEGRATING ORAL ONCE
Status: COMPLETED | OUTPATIENT
Start: 2024-04-22 | End: 2024-04-22

## 2024-04-22 RX ORDER — ONDANSETRON 4 MG/1
4 TABLET, ORALLY DISINTEGRATING ORAL EVERY 8 HOURS PRN
Qty: 30 TABLET | Refills: 1 | Status: SHIPPED | OUTPATIENT
Start: 2024-04-22 | End: 2024-06-24

## 2024-04-22 RX ADMIN — ONDANSETRON 4 MG: 4 TABLET, ORALLY DISINTEGRATING ORAL at 10:07

## 2024-04-22 NOTE — PATIENT INSTRUCTIONS
Take the zofran as needed for your nausea.    We will check a CT scan of your abdomen to see why you have pain and distention.    Your blood pressure was dangerously high today, I sent a prescription for lisinopril to the pharmacy.  Take this once a day.    I will send a referral to [facility] pain clinic, hopefully they can help you with your pain.    You should get a follow up appointment for next week to recheck you blood pressure and abdominal pain.

## 2024-04-22 NOTE — PROGRESS NOTES
"  Assessment & Plan     Abdominal distention  Unclear etiology, will needs bloodwork, scan, and follow up  - ondansetron (ZOFRAN ODT) 4 MG ODT tab; Take 1 tablet (4 mg) by mouth every 8 hours as needed for nausea  - Comprehensive metabolic panel; Future  - CBC with platelets; Future  - CT ABDOMEN PELVIS W CONTRAST; Future  - ondansetron (ZOFRAN ODT) ODT tab 4 mg  - CBC with platelets  - Comprehensive metabolic panel    Type 2 diabetes mellitus without complication, without long-term current use of insulin (H)    - Lipid panel reflex to direct LDL Non-fasting; Future  - lisinopril (ZESTRIL) 20 MG tablet; Take 1 tablet (20 mg) by mouth daily  - Lipid panel reflex to direct LDL Non-fasting    Resistant hypertension  Restart lisinopril, pt refused further intervention    Chronic pain syndrome  Chronic neck and head pain with significant comorbidities, would like pain clinic  - Pain Management  Referral; Future    Weight loss  Unknown etiology, bloodwork and scan as above            Nicotine/Tobacco Cessation  He reports that he has been smoking cigarettes. He has been exposed to tobacco smoke. He has never used smokeless tobacco.  Nicotine/Tobacco Cessation Plan  Information offered: Patient not interested at this time      BMI  Estimated body mass index is 28.55 kg/m  as calculated from the following:    Height as of this encounter: 1.847 m (6' 0.7\").    Weight as of this encounter: 97.3 kg (214 lb 9.6 oz).         Follow up one week    No follow-ups on file.    Ray Orozco is a 63 year old, presenting for the following health issues:  Abdominal Pain, Headache, Nausea, and Shaking      4/22/2024     9:33 AM   Additional Questions   Roomed by Bj GEIGER   Accompanied by Self         4/22/2024    Information    services provided? No     HPI     Ernesto comes in for several complaints.  He reports continued daily headache that has been ongoing for several years.  He reports that the spine " "clinic has not offered any helpful treamtents thus far.  We discussed pain clinic and he is amenable to referral for that.    He has elevated bp today, and reports that he always has that.  He denies any change in headache, chest pain, or unilateral weakness.  He has been off his bp meds and refuses admission to the hospital or further intervention.  He understood his risk for stroke or heart attack from this.    He also has abdominal pain, distention, and nausea.  He reports that his has been ongoing problem for him.  He has had some workup for this in the past but no good follow up.  He has also lost over 50 pounds in the last year.  Despite this he remains distended, nauseated and weak.  No bloody or dark vomitus or stools.  He has had diabetes in the past, but he denies polyuria or polydipsia and on his last bmp his glucose was less than 150. He has no fevers or chills, and has no flank or urinary symptoms.          Review of Systems  Constitutional, HEENT, cardiovascular, pulmonary, gi and gu systems are negative, except as otherwise noted.      Objective    BP (!) 190/136   Pulse 111   Temp 98  F (36.7  C) (Oral)   Resp 18   Ht 1.847 m (6' 0.7\")   Wt 97.3 kg (214 lb 9.6 oz)   SpO2 95%   BMI 28.55 kg/m    Body mass index is 28.55 kg/m .  Physical Exam   GENERAL: alert and no distress  EYES: Eyes grossly normal to inspection, PERRL and conjunctivae and sclerae normal  HENT: ear canals and TM's normal, nose and mouth without ulcers or lesions  NECK: no adenopathy, thyroid normal to palpation, and extensive surgical scars and decreased rom  RESP: lungs clear to auscultation - no rales, rhonchi or wheezes  CV: regular rate and rhythm, normal S1 S2, no S3 or S4, no murmur, click or rub, no peripheral edema  ABDOMEN: tenderness throughout, no peritoneal signs, bowel sounds normal, and distention  MS: no gross musculoskeletal defects noted, no edema  SKIN: no suspicious lesions or rashes  NEURO: Normal strength " and tone, mentation intact and speech normal  PSYCH: mentation appears normal, affect normal/bright            Signed Electronically by: Reji Naidu MD

## 2024-04-25 ENCOUNTER — OFFICE VISIT (OUTPATIENT)
Dept: FAMILY MEDICINE | Facility: CLINIC | Age: 64
End: 2024-04-25
Payer: COMMERCIAL

## 2024-04-25 VITALS
TEMPERATURE: 97.6 F | HEART RATE: 115 BPM | RESPIRATION RATE: 18 BRPM | WEIGHT: 215.2 LBS | DIASTOLIC BLOOD PRESSURE: 67 MMHG | BODY MASS INDEX: 29.15 KG/M2 | HEIGHT: 72 IN | OXYGEN SATURATION: 98 % | SYSTOLIC BLOOD PRESSURE: 97 MMHG

## 2024-04-25 DIAGNOSIS — R14.0 ABDOMINAL DISTENTION: ICD-10-CM

## 2024-04-25 DIAGNOSIS — I10 HYPERTENSION, UNSPECIFIED TYPE: ICD-10-CM

## 2024-04-25 DIAGNOSIS — M47.812 CERVICAL SPINE ARTHRITIS WITH NERVE PAIN: ICD-10-CM

## 2024-04-25 DIAGNOSIS — M79.2 CERVICAL SPINE ARTHRITIS WITH NERVE PAIN: ICD-10-CM

## 2024-04-25 DIAGNOSIS — E11.9 TYPE 2 DIABETES MELLITUS WITHOUT COMPLICATION, WITHOUT LONG-TERM CURRENT USE OF INSULIN (H): Primary | ICD-10-CM

## 2024-04-25 PROCEDURE — 99214 OFFICE O/P EST MOD 30 MIN: CPT | Performed by: FAMILY MEDICINE

## 2024-04-25 RX ORDER — RESPIRATORY SYNCYTIAL VIRUS VACCINE 120MCG/0.5
0.5 KIT INTRAMUSCULAR ONCE
Qty: 1 EACH | Refills: 0 | Status: CANCELLED | OUTPATIENT
Start: 2024-04-25 | End: 2024-04-25

## 2024-04-25 ASSESSMENT — PATIENT HEALTH QUESTIONNAIRE - PHQ9
10. IF YOU CHECKED OFF ANY PROBLEMS, HOW DIFFICULT HAVE THESE PROBLEMS MADE IT FOR YOU TO DO YOUR WORK, TAKE CARE OF THINGS AT HOME, OR GET ALONG WITH OTHER PEOPLE: SOMEWHAT DIFFICULT
SUM OF ALL RESPONSES TO PHQ QUESTIONS 1-9: 12
SUM OF ALL RESPONSES TO PHQ QUESTIONS 1-9: 12

## 2024-04-25 NOTE — PROGRESS NOTES
"  Assessment & Plan     Type 2 diabetes mellitus without complication, without long-term current use of insulin (H)  Last A1C in normal range, will continue diet and exercise and follow    Cervical spine arthritis with nerve pain  Schedule pain clinic referral    Abdominal distention  Reviewed labs, Abd CT pending              No follow-ups on file.    Subjective   Ernesto is a 63 year old, presenting for the following health issues:  Follow Up (STOMACH AND HEADACHE)        4/25/2024    10:50 AM   Additional Questions   Roomed by Bj GEIGER   Accompanied by Self         4/25/2024    Information    services provided? No     HPI     Ernesto comes in for follow up of his labs.  He reports that his nausea has resolved with the zofran, and that his abdominal pain is at a 1/10 today. He reports that his headache is also at a 1/10 today.  He has not spoken to referrals yet for his pain clinic and abdominal CT.    We started him on lisinopril on Monday and he reports some orthostatic hypotension symptoms with standing, but otherwise feeling well.  No dyspnea or chest pain.  He reports normal bowel movments and urination.    We reviewed his bloodwork and discussed the need for recheck of his CBC for a high HGB, which if this represents P.vera could account for his headaches.  We also discussed his high triglicerides which need fasting recheck as well as his A1C which has gone from >12 to 5.7.  He reports that he has cut back on alcohol significantly and is eating less as noted by his 50 pound wt loss.  He remains distended however and we will await his CT results          Review of Systems  Constitutional, HEENT, cardiovascular, pulmonary, gi and gu systems are negative, except as otherwise noted.      Objective    BP 97/67   Pulse 115   Temp 97.6  F (36.4  C) (Oral)   Resp 18   Ht 1.836 m (6' 0.3\")   Wt 97.6 kg (215 lb 3.2 oz)   SpO2 98%   BMI 28.94 kg/m    Body mass index is 28.94 kg/m .  Physical Exam "   GENERAL: alert and no distress  EYES: Eyes grossly normal to inspection, PERRL and conjunctivae and sclerae normal  HENT: ear canals and TM's normal, nose and mouth without ulcers or lesions  NECK: no adenopathy, thyroid normal to palpation, and anterior scars from cervical surgeries  RESP: lungs clear to auscultation - no rales, rhonchi or wheezes  CV: regular rate and rhythm, normal S1 S2, no S3 or S4, no murmur, click or rub, no peripheral edema  ABDOMEN: bowel sounds normal and distended with mild pain throughout  MS: no gross musculoskeletal defects noted, no edema  SKIN: no suspicious lesions or rashes  NEURO: Normal strength and tone, mentation intact and speech normal  PSYCH: mentation appears normal, affect normal/bright    Office Visit on 04/22/2024   Component Date Value Ref Range Status    WBC Count 04/22/2024 7.9  4.0 - 11.0 10e3/uL Final    RBC Count 04/22/2024 5.69  4.40 - 5.90 10e6/uL Final    Hemoglobin 04/22/2024 18.0 (H)  13.3 - 17.7 g/dL Final    Hematocrit 04/22/2024 49.9  40.0 - 53.0 % Final    MCV 04/22/2024 88  78 - 100 fL Final    MCH 04/22/2024 31.6  26.5 - 33.0 pg Final    MCHC 04/22/2024 36.1  31.5 - 36.5 g/dL Final    RDW 04/22/2024 12.8  10.0 - 15.0 % Final    Platelet Count 04/22/2024 224  150 - 450 10e3/uL Final    Sodium 04/22/2024 132 (L)  135 - 145 mmol/L Final    Reference intervals for this test were updated on 09/26/2023 to more accurately reflect our healthy population. There may be differences in the flagging of prior results with similar values performed with this method. Interpretation of those prior results can be made in the context of the updated reference intervals.     Potassium 04/22/2024 4.1  3.4 - 5.3 mmol/L Final    Carbon Dioxide (CO2) 04/22/2024 18 (L)  22 - 29 mmol/L Final    Anion Gap 04/22/2024 15  7 - 15 mmol/L Final    Urea Nitrogen 04/22/2024 13.6  8.0 - 23.0 mg/dL Final    Creatinine 04/22/2024 0.76  0.67 - 1.17 mg/dL Final    GFR Estimate 04/22/2024 >90   >60 mL/min/1.73m2 Final    Calcium 04/22/2024 9.0  8.8 - 10.2 mg/dL Final    Chloride 04/22/2024 99  98 - 107 mmol/L Final    Glucose 04/22/2024 138 (H)  70 - 99 mg/dL Final    Alkaline Phosphatase 04/22/2024 198 (H)  40 - 150 U/L Final    Reference intervals for this test were updated on 11/14/2023 to more accurately reflect our healthy population. There may be differences in the flagging of prior results with similar values performed with this method. Interpretation of those prior results can be made in the context of the updated reference intervals.    AST 04/22/2024    Final    Unsatisfactory specimen - lipemic    Reference intervals for this test were updated on 6/12/2023 to more accurately reflect our healthy population. There may be differences in the flagging of prior results with similar values performed with this method. Interpretation of those prior results can be made in the context of the updated reference intervals.    ALT 04/22/2024    Final    Unsatisfactory specimen - lipemic    Reference intervals for this test were updated on 6/12/2023 to more accurately reflect our healthy population. There may be differences in the flagging of prior results with similar values performed with this method. Interpretation of those prior results can be made in the context of the updated reference intervals.      Protein Total 04/22/2024 7.7  6.4 - 8.3 g/dL Final    Albumin 04/22/2024 4.3  3.5 - 5.2 g/dL Final    Bilirubin Total 04/22/2024 0.8  <=1.2 mg/dL Final    Cholesterol 04/22/2024 358 (H)  <200 mg/dL Final    Triglycerides 04/22/2024 2,556 (H)  <150 mg/dL Final    Direct Measure HDL 04/22/2024 12 (L)  >=40 mg/dL Final    LDL Cholesterol Calculated 04/22/2024    Final    Cannot estimate LDL when triglyceride exceeds 400 mg/dL    Non HDL Cholesterol 04/22/2024 346 (H)  <130 mg/dL Final    Patient Fasting > 8hrs? 04/22/2024 Yes   Final    Hemoglobin A1C 04/22/2024 5.7 (H)  0.0 - 5.6 % Final    Normal <5.7%    Prediabetes 5.7-6.4%    Diabetes 6.5% or higher     Note: Adopted from ADA consensus guidelines.    LDL Cholesterol Direct 04/22/2024 21  <100 mg/dL Final    Age 2-19 years:  Desirable: 0-110 mg/dL   Borderline high: 110-129 mg/dL   High: >= 130 mg/dL    Age 20 years and older:  Desirable: <100mg/dL  Above desirable: 100-129 mg/dL   Borderline high: 130-159 mg/dL   High: 160-189 mg/dL   Very high: >= 190 mg/dL           Signed Electronically by: Reji Naidu MD    Answers submitted by the patient for this visit:  Patient Health Questionnaire (Submitted on 4/25/2024)  If you checked off any problems, how difficult have these problems made it for you to do your work, take care of things at home, or get along with other people?: Somewhat difficult  PHQ9 TOTAL SCORE: 12

## 2024-04-30 ENCOUNTER — TELEPHONE (OUTPATIENT)
Dept: FAMILY MEDICINE | Facility: CLINIC | Age: 64
End: 2024-04-30
Payer: COMMERCIAL

## 2024-04-30 NOTE — TELEPHONE ENCOUNTER
Pt called and asked if his appointments had already been scheduled from the referrals that Dr. Naidu had put in last week. I informed pt that I don't see any future appointments scheduled, but his imaging order is in a work queue and someone will reach out to him to schedule. Pt stated that someone had told him that his appointments were going to be scheduled for him, I informed him that he would have to speak with a  to review the date/time that are suitable for him. I offered to connect pt to  over the phone, pt disconnected the call.    Pain management referral was faxed over to the [facility] in [place] on 4/26. Left vm for  at [facility] to get confirmation that they received the fax. Will send it again.     WILLIE Burris

## 2024-05-01 ENCOUNTER — TELEPHONE (OUTPATIENT)
Dept: FAMILY MEDICINE | Facility: CLINIC | Age: 64
End: 2024-05-01
Payer: COMMERCIAL

## 2024-05-01 DIAGNOSIS — M47.812 CERVICAL SPINE ARTHRITIS WITH NERVE PAIN: Primary | ICD-10-CM

## 2024-05-01 DIAGNOSIS — M79.2 CERVICAL SPINE ARTHRITIS WITH NERVE PAIN: Primary | ICD-10-CM

## 2024-05-01 NOTE — TELEPHONE ENCOUNTER
[facility] phone call message- general phone call:    Reason for call: He would like a referral for the [facility] [place] for his headaches    Action desired: call back.    Return call needed: Yes    OK to leave a message on voice mail? Yes    Advised patient to response may take up to 2 business days: Yes    Primary language: English      needed? No    Call taken on May 1, 2024 at 3:47 PM by Dena Grijalva

## 2024-05-01 NOTE — TELEPHONE ENCOUNTER
Pt called and asked when his appointments are scheduled for. Pt said that he was told by someone that his appointment would already be scheduled. I infrmed pt that he would have to speak with a  and review the dates/times that work for him. Pt agreed to being put on hold and being transferred to . Pt schedule CT.    We discussed pain management referral. Pt would prefer to go somewhere close to [place]. Pt does not want to go to [facility] as noted on referral. We attempted to schedule pt with [facility], however, the referral is still being reviewed. Pt call was transferred to a  for them to explain. Pt was informed that he should expect a call in the future once referral has been reviewed.    WILLIE Burris

## 2024-05-06 ENCOUNTER — HOSPITAL ENCOUNTER (OUTPATIENT)
Dept: CT IMAGING | Facility: HOSPITAL | Age: 64
Discharge: HOME | End: 2024-05-06
Attending: FAMILY MEDICINE
Payer: COMMERCIAL

## 2024-05-06 ENCOUNTER — OFFICE VISIT (OUTPATIENT)
Dept: PALLIATIVE MEDICINE | Facility: OTHER | Age: 64
End: 2024-05-06
Attending: FAMILY MEDICINE
Payer: COMMERCIAL

## 2024-05-06 VITALS
SYSTOLIC BLOOD PRESSURE: 150 MMHG | HEART RATE: 121 BPM | WEIGHT: 185 LBS | OXYGEN SATURATION: 95 % | BODY MASS INDEX: 24.88 KG/M2 | DIASTOLIC BLOOD PRESSURE: 114 MMHG

## 2024-05-06 DIAGNOSIS — G89.4 CHRONIC PAIN SYNDROME: ICD-10-CM

## 2024-05-06 DIAGNOSIS — R51.9 CHRONIC DAILY HEADACHE: ICD-10-CM

## 2024-05-06 DIAGNOSIS — Z98.1 STATUS POST CERVICAL SPINAL ARTHRODESIS: ICD-10-CM

## 2024-05-06 DIAGNOSIS — M50.30 DDD (DEGENERATIVE DISC DISEASE), CERVICAL: ICD-10-CM

## 2024-05-06 DIAGNOSIS — M79.10 MYALGIA: Primary | ICD-10-CM

## 2024-05-06 DIAGNOSIS — R14.0 ABDOMINAL DISTENTION: ICD-10-CM

## 2024-05-06 PROCEDURE — 74177 CT ABD & PELVIS W/CONTRAST: CPT

## 2024-05-06 PROCEDURE — 250N000009 HC RX 250: Performed by: FAMILY MEDICINE

## 2024-05-06 PROCEDURE — 250N000011 HC RX IP 250 OP 636: Performed by: FAMILY MEDICINE

## 2024-05-06 PROCEDURE — G0463 HOSPITAL OUTPT CLINIC VISIT: HCPCS | Mod: 25 | Performed by: NURSE PRACTITIONER

## 2024-05-06 PROCEDURE — 99205 OFFICE O/P NEW HI 60 MIN: CPT | Performed by: NURSE PRACTITIONER

## 2024-05-06 PROCEDURE — G2211 COMPLEX E/M VISIT ADD ON: HCPCS | Performed by: NURSE PRACTITIONER

## 2024-05-06 RX ORDER — IBUPROFEN 800 MG/1
800 TABLET, FILM COATED ORAL EVERY 8 HOURS PRN
COMMUNITY

## 2024-05-06 RX ORDER — IOPAMIDOL 755 MG/ML
90 INJECTION, SOLUTION INTRAVASCULAR ONCE
Status: COMPLETED | OUTPATIENT
Start: 2024-05-06 | End: 2024-05-06

## 2024-05-06 RX ADMIN — SODIUM CHLORIDE 72 ML: 9 INJECTION, SOLUTION INTRAVENOUS at 17:21

## 2024-05-06 RX ADMIN — IOPAMIDOL 90 ML: 755 INJECTION, SOLUTION INTRAVENOUS at 17:21

## 2024-05-06 ASSESSMENT — ANXIETY QUESTIONNAIRES
6. BECOMING EASILY ANNOYED OR IRRITABLE: NEARLY EVERY DAY
8. IF YOU CHECKED OFF ANY PROBLEMS, HOW DIFFICULT HAVE THESE MADE IT FOR YOU TO DO YOUR WORK, TAKE CARE OF THINGS AT HOME, OR GET ALONG WITH OTHER PEOPLE?: EXTREMELY DIFFICULT
3. WORRYING TOO MUCH ABOUT DIFFERENT THINGS: NEARLY EVERY DAY
7. FEELING AFRAID AS IF SOMETHING AWFUL MIGHT HAPPEN: NEARLY EVERY DAY
5. BEING SO RESTLESS THAT IT IS HARD TO SIT STILL: MORE THAN HALF THE DAYS
GAD7 TOTAL SCORE: 20
2. NOT BEING ABLE TO STOP OR CONTROL WORRYING: NEARLY EVERY DAY
GAD7 TOTAL SCORE: 20
1. FEELING NERVOUS, ANXIOUS, OR ON EDGE: NEARLY EVERY DAY
4. TROUBLE RELAXING: NEARLY EVERY DAY
7. FEELING AFRAID AS IF SOMETHING AWFUL MIGHT HAPPEN: NEARLY EVERY DAY
IF YOU CHECKED OFF ANY PROBLEMS ON THIS QUESTIONNAIRE, HOW DIFFICULT HAVE THESE PROBLEMS MADE IT FOR YOU TO DO YOUR WORK, TAKE CARE OF THINGS AT HOME, OR GET ALONG WITH OTHER PEOPLE: EXTREMELY DIFFICULT

## 2024-05-06 ASSESSMENT — PAIN SCALES - PAIN ENJOYMENT GENERAL ACTIVITY SCALE (PEG)
PEG_TOTALSCORE: 8.67
PEG_TOTALSCORE: 8.67
INTERFERED_GENERAL_ACTIVITY: 9
INTERFERED_GENERAL_ACTIVITY: 9
AVG_PAIN_PASTWEEK: 7
INTERFERED_ENJOYMENT_LIFE: 10
AVG_PAIN_PASTWEEK: 7
INTERFERED_ENJOYMENT_LIFE: 10 - COMPLETELY INTERFERES

## 2024-05-06 NOTE — PROGRESS NOTES
"Date:5/6/2024      COMPREHENSIVE PAIN CLINIC INITIAL EVALUATION    I had the pleasure of meeting Mr. Ernesto Gomez on 5/6/2024 in the [facility] in consult for Dr. Naidu with regards to his pain.  The patient is a 63 year old male with past medical history of cervicalgia, s/p C5-7 ACDF in 2011 and extension of the fusion from C3-C5, HA, HTN, chronic intractable pain, DM II, h/o cocaine and alcohol abuse, COPD, depression,  who presents for evaluation of chronic pain.      History of of chronic pain on initial exam 05/06/2024                             Subjective:  Patient is a poor historian.  He has a care coordinator from [facility].  Patient endorses chronic pain in cervical spine associated with HA that started over 10 years ago without a precipitating event. He has a history of a C5-C7 ACDF in 2011 and then extension of his fusion from C3-C5 2021 with Dr. Singh.  Patient has numbness and tingling in R) thumb and feet.  Patient had 2 cervical spine surgery in the past.  HA can be his whole head or latley mostly on R) side.  The patient describes the pain as constant, throbbing sometimes sharp.  He reports that the pain is made worse by coughing, sneezing, activity.  His pain is improved with \"unknown\". Pain interferes with ADL's, activities and sleep.   He rates his currenty pain score at 2-3/10, but it can be as low as 2/10 or as severe as 10/10.     He has chronic abdominal pain and has lost weight.  He does have nausea and vomiting at times.      Patient endorses anxiety/depression.  Patient does follow with a mental health care provider.  Physical therapy was over 1 year ago.  Patient exercises by stretching.      Progress Notes Reviewed:  03/04/2024 ED - [facility]  04/25/2024 Dr. Reji Naidu  02/20/2024 [facility] Reddy [facility]  L4, L5 medial branch/dorsal rami radiofrequency ablation/neurotomy procedure.   09/27/2022 Sarah Reddy PA-C - history of a C5-C7 ACDF in 2011 and then extension of his " fusion from C3-C5 2021 with Dr. Singh.       He denies any new problems with falls or balance, any new numbness or weakness of the arms or legs, any new bowel or bladder incontinence, any night sweats or unexplained fevers, or any sudden or unexpected weight loss.      Ernesto HACKETT Patricia has not been seen at a pain clinic in the past.        Current Treatments:  Ibuprofen PRN      Anticoagulation:  none      Previous Medication Treatments Included:  He is a poor historian.  He can't remember his medications.  Anti-convulsants: neurontin he abruptly stopped  Muscle relaxors:   Anti-depressants:   Benzodiazapine's:   Acetaminophen/NSAIDs:   Topicals:   Headache abortives:   Headache prophylactics:   Opioids:       Other Treatments Have Included:  Physical therapy: over 1 year ago  Pain Psychology: no  Chiropractic: yes - unsure if helpful  Acupuncture: no  TENs Unit: no  Injections: Lumbar RFA earlier 2024 through spine  Surgeries: 2 cervical surgeries  Dry Needling: no  Massage:no    Implantable devices:  none      Past Medical History:  Medical history reviewed.  Past Medical History:   Diagnosis Date    Alcohol abuse     Cervical disc disease     Cocaine addiction (H)     COPD (chronic obstructive pulmonary disease) (H) 6/15/2022    Diabetes mellitus, type 2 (H) 4/22/2024    DJD (degenerative joint disease) 12/13/2006    GERD (gastroesophageal reflux disease)     Tobacco abuse       Patient Active Problem List   Diagnosis    Cervical spine arthritis with nerve pain    Current moderate episode of major depressive disorder without prior episode (H)    DJD (degenerative joint disease)    Tobacco abuse    GERD (gastroesophageal reflux disease)    ETOH abuse    Cocaine addiction (H)    COPD (chronic obstructive pulmonary disease) (H)    Morbid obesity (H)    Diabetes mellitus, type 2 (H)         Past Surgical History:  Pertinent surgical history reviewed.  Past Surgical History:   Procedure Laterality Date    NECK  SURGERY  07/13/2021    cervical spinal fusion          Medications: Pertinent medications reviewed.  Current Outpatient Medications   Medication Sig Dispense Refill    lisinopril (ZESTRIL) 20 MG tablet Take 1 tablet (20 mg) by mouth daily 90 tablet 3    ondansetron (ZOFRAN ODT) 4 MG ODT tab Take 1 tablet (4 mg) by mouth every 8 hours as needed for nausea 30 tablet 1       [place] Prescription Monitoring Program reviewed 5/6/2024.  No concern for abuse or misuse of controlled medications based on this report.  No controlled medications are being prescribed.        Allergies: Pertinent allergies reviewed.   No Known Allergies    Family History:   family history is not on file.    Social History:   He lives in an apartment in [place].  He is independent in ADL's.  He is on disability.    He  reports that he has been smoking cigarettes. He has been exposed to tobacco smoke. He has never used smokeless tobacco. He reports current alcohol use. He reports that he does not currently use drugs.  Social History     Social History Narrative    Not on file         Review of Systems:      (Positive responses bolded)  GENERAL: fever/chills, fatigue, general unwell feeling, weight gain/loss  HEAD/EYES:  headache, dizziness, or vision changes  EARS/NOSE/THROAT: nosebleeds, hearing loss, sinus infection, earache, tinnitus  IMMUNE:  allergies, cancer, immune deficiency, or infections  SKIN:  itching, rash, hives  HEME/Lymphatic: anemia, easy bruising, easy bleeding  RESPIRATORY: cough, wheezing, or shortness of breath  CARDIOVASCULAR/Circulation: extremity edema, syncope, hypertension, tachycardia, or angina  GASTROINTESTINAL: abdominal pain, nausea/emesis, diarrhea, constipation, hematochezia, or melena  ENDOCRINE:  diabetes, steroid use, thyroid disease or osteoporosis  MUSCULOSKELETAL: myalgias, joint pain, stiffness, neck pain, back pain, arthritis, or gout  GENITOURINARY: frequency, urgency, dysuria, difficulty voiding, hematuria or  incontinence  NEUROLOGIC: weakness, numbness, paresthesias, seizure, tremor, stroke or memory loss  PSYCHIATRIC: depression, anxiety, stress, suicidal thoughts/attempts or mood swings      Physical Exam:  BP (!) 150/114   Pulse (!) 121   Wt 83.9 kg (185 lb)   SpO2 95%   BMI 24.88 kg/m        Constitutional: He is oriented to person, place, and time.  He appears well-developed and well-nourished. He is not in acute distress.   HENT:     Head: Normocephalic and atraumatic.     Eyes: Pupils are equal, round, and reactive to light. EOM are normal. No scleral icterus.   Pulmonary/Chest:  NWOB. No respiratory distress.   Neurological: He is alert and oriented to person, place, and time. Coordination grossly normal.  Romberg test negative. Camargo's test is negative  Skin: Skin is warm and dry. He is not diaphoretic.   Psychiatric: He has a normal mood and affect. His behavior is normal. Judgment and thought content normal.  Patient answers questions appropriately.  MSK: Gait is non-antalgic.  Patient can walk on toes, heals, heal toe walk and perform heal to shin testing without difficulty.    Cervical spine:  ROM: full  Rotation/ext to right: painful   Rotation/ext to left: painful   Myofascial tenderness: left paracervical muscles, right paracervical muscles  Normal 5/5 UE strength bilaterally   Normal sensation to light touch in the C4-T1 distributions bilaterally   No allodynia, dysesthesia, or hyperalgesia in the upper extremities bilaterally   Reflexes: Normal 2/2 of biceps and bracioradialis            DIRE Score for ongoing opioid management is calculated as follows:    Diagnosis = 2    Intractability = 2    Risk: Psych = 2  Chem Hlth = 2  Reliability = 2  Social = 2    Efficacy = 2    Total DIRE Score = 14 (14 or higher predicts good candidate for ongoing opioid management; 13 or lower predicts poor candidate for opioid management)         Imaging:  Mr Cervical Spine Without Contrast     Result Date:  5/30/2020  EXAM: MR CERVICAL SPINE WO CONTRAST LOCATION: [facility] DATE/TIME: 5/30/2020 1:13 AM INDICATION: Neck pain. Right arm paresthesias. COMPARISON: CT cervical spine 02/01/2017 TECHNIQUE: Without IV contrast. FINDINGS: C5-C7 ACDF. Normal vertebral body heights. C7-T1 mild anterolisthesis likely degenerative. Alignment otherwise within normal limits. No concerning bone marrow signal changes identified. No abnormal cord signal. No significant extraspinal abnormality. Right C4-C5 facet joints demonstrate mild soft tissue increased STIR signal could suggest infectious inflammatory facet arthropathy. Multilevel degenerative disc disease manifested by disc desiccation and endplate osteophytosis. Multilevel uncovertebral and facet arthropathy. Craniovertebral junction and C1-C2: No significant thecal sac stenosis. C2-C3: No significant posterior disc protrusion. Uncovertebral facet degenerative change. No significant thecal sac stenosis. Mild left foraminal stenosis. No significant right foraminal stenosis. C3-C4: Mild bulge. Uncovertebral facet degenerative change. No significant thecal sac stenosis. Mild-to-moderate left foraminal stenosis. Severe right foraminal stenosis. C4-C5: Mild bulge. Uncovertebral facet degenerative change. No significant thecal sac stenosis. Moderate left foraminal stenosis. Mild-to-moderate right foraminal stenosis. C5-C6: No posterior disc protrusion. Uncovertebral facet degenerative change. No significant thecal sac stenosis. Mild bilateral foraminal stenosis. C6-C7: No posterior disc protrusion. Uncovertebral facet degenerative change. No significant thecal sac stenosis. No significant left foraminal stenosis. Mild-to-moderate right foraminal stenosis. C7-T1: Mild anterolisthesis likely degenerative. Superimposed mild bulge. Facet degenerative change. Mild thecal sac stenosis measuring 9 mm AP dimension. Moderate left foraminal stenosis. Moderate right foraminal stenosis.  T1-T2: Bulge. Superimposed small posterior disc extrusion extending mildly above and below the disc margin. Facet degenerative change. Borderline thecal sac stenosis measuring 10 mm AP dimension. Severe left foraminal stenosis. Mild-to-moderate right foraminal stenosis. T2-T3: Mild bulge. Small superimposed posterior disc extrusion extending subtly above and below the disc margin. Facet degenerative change. No significant thecal sac stenosis. Moderate left foraminal stenosis. Moderate right foraminal stenosis.      1.  C5-C7 ACDF. 2.  Right C4-C5 facet joints demonstrate mild soft tissue increased STIR signal could suggest infectious inflammatory facet arthropathy. 3.  Multilevel spondylosis as described above. 4.  C3-C4 severe right foraminal stenosis. 5.  T2-T3 severe left foraminal stenosis. 6.  Additional degenerative changes described above. 7.  No cord signal abnormality.              CT ANGIO HEAD AND NECK CAROTID  Order: [id number]  Impression    HEAD CT:  1.  No evidence of acute hemorrhage or other acute intracranial abnormality.    HEAD CTA:  1.  No evidence of proximal large vessel occlusion or flow-limiting stenosis.    NECK CTA:  1.  No evidence of hemodynamically significant stenosis based on NASCET criteria.  2.  No evidence for dissection.  Narrative    For Patients: As a result of the 21st Century Cures Act, medical imaging exams and procedure reports are released immediately into your electronic medical record. You may view this report before your referring provider. If you have questions, please contact your health care provider.    EXAM: CT ANGIO HEAD AND NECK CAROTID  LOCATION: [facility]  DATE: 3/4/2024    INDICATION: Headache x 3 weeks, acute severe x 2 hours.  COMPARISON: None.  CONTRAST: None.  TECHNIQUE: Head and neck CT angiogram with IV contrast. Noncontrast head CT followed by axial helical CT images of the head and neck vessels obtained during the arterial phase of  intravenous contrast administration. Axial 2D reconstructed images and multiplanar 3D MIP reconstructed images of the head and neck vessels were performed by the technologist. Dose reduction techniques were used. All stenosis measurements made according to NASCET criteria unless otherwise specified.    FINDINGS:  NONCONTRAST HEAD CT:  INTRACRANIAL CONTENTS: No intracranial hemorrhage, extraaxial collection, or mass effect.  No CT evidence of acute infarct. Unremarkable parenchymal attenuation. Age-appropriate ventricles and sulci. The sella is unremarkable for technique. The cerebellar tonsils are appropriately positioned.    VISUALIZED ORBITS/SINUSES/MASTOIDS: No intraorbital abnormality. No paranasal sinus mucosal disease. No middle ear or mastoid effusion.    BONES/SOFT TISSUES: No scalp hematoma. No skull fracture.    HEAD CTA:  ANTERIOR CIRCULATION: The intracranial internal carotid and anterior cerebral arteries appear patent. The right and left middle cerebral arteries are without evidence of proximal occlusion or flow-limiting stenosis. No aneurysm or high flow vascular malformation is demonstrated.    POSTERIOR CIRCULATION: The intradural vertebral, basilar and visualized proximal cerebellar arteries appear patent. The right and left posterior cerebral arteries also appear patent. No aneurysm or high flow vascular malformation is demonstrated.    DURAL VENOUS SINUSES: Expected enhancement of the major dural venous sinuses.    NECK CTA:  RIGHT CAROTID: No measurable stenosis or dissection.    LEFT CAROTID: No measurable stenosis or dissection.    VERTEBRAL ARTERIES: No focal stenosis or dissection. Balanced vertebral arteries.    AORTIC ARCH: There is a left-sided aortic arch. No flow-limiting stenosis is apparent at the origins of the brachiocephalic, common carotid, subclavian or vertebral arteries.    NONVASCULAR STRUCTURES: The visualized lung apices are well aerated. Postoperative and degenerative  changes of the cervical spine. Fatty atrophy of the parotid glands. Soft tissues of the neck, including the thyroid gland is otherwise grossly unremarkable for technique. Advanced multifocal dental disease.  Exam End: 03/04/24  7:41 PM    Specimen Collected: 03/04/24  7:41 PM Last Resulted: 03/04/24  7:59 PM       EMG:  na      Diagnosis:  (M79.10) Myalgia  (primary encounter diagnosis)  Comment:   Plan: PAIN INJECTION EVAL/TREAT/FOLLOW UP            (M50.30) DDD (degenerative disc disease), cervical  Comment:   Plan:     (R51.9) Chronic daily headache  Comment:   Plan:     (Z98.1) Status post cervical spinal arthrodesis  Comment:   Plan:     (G89.4) Chronic pain syndrome  Comment:   Plan: PAIN INJECTION EVAL/TREAT/FOLLOW UP                Plan on initial consult 05/06/2024:  A multimodal plan was developed today to treat your pain.  Multimodal analgesia is a strategy that reduces reliance on opioids through the use of non-opioid analgesics and therapies that have different mechanisms of action.    Diagnostics: Reviewed cervical MRI 05/2020.  Consider new cervical MRI.        Medications:  No medication changes today.      The following OTC pain medications may be helpful, use as directed: Voltaren Gel 1%, CBD products, Arnica products, Capsaicin products, Australian Dream Cream, Epson It,  Lidocaine Patch, Solanpas, Biofreeze, Aspercream, Tiger Balm and Esau Emu cream.  Apply heat or cold PRN.      Therapies:  PHYSICAL THERAPY - referral place today for [facility] since patient lives in [place].      Discussed the importance of core strengthening, ROM, stretching exercises with the patient and how each of these entities is important in decreasing pain.  Explained to the patient that the purpose of physical therapy is to teach the patient a home exercise program.  These exercises need to be performed every day in order to decrease pain and prevent future occurrences of pain.        Discussed Grounding Mat - handout  provided  https://www.youtube.com/watch?v=NvZHMN2Rd4E    Discussed Frequency Specific Microcurrent - handout provided  Treatment for Neuropathic Pain.   [facility] [phone]  [facility] [phone]  [facility] [phone]  [facility] [phone]  May be able to be billed as a chiropractic service depending on your insurance coverage.     Discussed Acupuncture for headaches and myalgias.        Interventions:  B/l Cervical TPI with Dr. Price    Consider STANLEY and ONB with Dr. Price in the future      Follow up:     2 wks after cervical TPI with ANDRÉS Brizuela, RN, CNP, FNP  [facility]        BILLING TIME DOCUMENTATION:   The total TIME spent on this patient on the date of the encounter/appointment was 74 minutes.            Answers submitted by the patient for this visit:  DALY-7 (Submitted on 5/6/2024)  DALY 7 TOTAL SCORE: 20

## 2024-05-06 NOTE — PATIENT INSTRUCTIONS
Plan on initial consult 05/06/2024:  A multimodal plan was developed today to treat your pain.  Multimodal analgesia is a strategy that reduces reliance on opioids through the use of non-opioid analgesics and therapies that have different mechanisms of action.    Diagnostics: Reviewed cervical MRI        Medications:    The following OTC pain medications may be helpful, use as directed: Voltaren Gel 1%, CBD products, Arnica products, Capsaicin products, Australian Dream Cream, Epson It,  Lidocaine Patch, Solanpas, Biofreeze, Aspercream, Tiger Balm and Esau Emu cream.  Apply heat or cold PRN.      Therapies:  PHYSICAL THERAPY - referral place today for [facility] since patient lives in [place].      Discussed the importance of core strengthening, ROM, stretching exercises with the patient and how each of these entities is important in decreasing pain.  Explained to the patient that the purpose of physical therapy is to teach the patient a home exercise program.  These exercises need to be performed every day in order to decrease pain and prevent future occurrences of pain.        Discussed Grounding Mat - handout provided  https://www.[employer].com/watch?v=XfMUMX9Wf3L    Discussed Frequency Specific Microcurrent - handout provided  Treatment for Neuropathic Pain.   [facility] [phone]  [facility] [phone]  [facility] [phone]  [facility] [phone]  May be able to be billed as a chiropractic service depending on your insurance coverage.     Discussed Acupuncture.    Discussed TENs unit.      Interventions:      Cervical TPI with Dr. Price  Consider STANLEY and OCNB with Dr. Price in the future      Follow up:     2 wks after cervical TPI with Dr. Albert Bush, APRN, RN, CNP, FNP  [facility]Marilyn Powell          [facility]    Clinic Number:  [phone]  Call with any  questions about your care and for scheduling assistance.   Calls are returned Monday through Friday between 8 AM and 4:30 PM. We usually get back to you within 2 business days depending on the issue/request.    If we are prescribing your medications:  For opioid medication refills, call the clinic or send a [employer] message 7 days in advance.  Please include:  Name of requested medication  Name of the pharmacy.  For non-opioid medications, call your pharmacy directly to request a refill. Please allow 3-4 days to be processed.   Per [place] State Law:  All controlled substance prescriptions must be filled within 30 days of being written.    For those controlled substances allowing refills, pickup must occur within 30 days of last fill.      We believe regular attendance is key to your success in our program!    Any time you are unable to keep your appointment we ask that you call us at least 24 hours in advance to cancel.This will allow us to offer the appointment time to another patient.   Multiple missed appointments may lead to dismissal from the clinic.
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
## 2024-06-24 ENCOUNTER — OFFICE VISIT (OUTPATIENT)
Dept: FAMILY MEDICINE | Facility: CLINIC | Age: 64
End: 2024-06-24
Payer: COMMERCIAL

## 2024-06-24 VITALS
HEART RATE: 108 BPM | DIASTOLIC BLOOD PRESSURE: 86 MMHG | OXYGEN SATURATION: 95 % | WEIGHT: 214 LBS | BODY MASS INDEX: 28.99 KG/M2 | RESPIRATION RATE: 18 BRPM | HEIGHT: 72 IN | TEMPERATURE: 98 F | SYSTOLIC BLOOD PRESSURE: 134 MMHG

## 2024-06-24 DIAGNOSIS — R63.4 WEIGHT LOSS: Primary | ICD-10-CM

## 2024-06-24 DIAGNOSIS — K43.9 VENTRAL HERNIA WITHOUT OBSTRUCTION OR GANGRENE: ICD-10-CM

## 2024-06-24 DIAGNOSIS — R00.0 TACHYCARDIA: ICD-10-CM

## 2024-06-24 DIAGNOSIS — F10.10 ETOH ABUSE: ICD-10-CM

## 2024-06-24 DIAGNOSIS — R14.0 ABDOMINAL DISTENTION: ICD-10-CM

## 2024-06-24 DIAGNOSIS — R30.0 DYSURIA: ICD-10-CM

## 2024-06-24 PROBLEM — M48.02 CERVICAL STENOSIS OF SPINAL CANAL: Status: ACTIVE | Noted: 2024-06-24

## 2024-06-24 LAB
ALBUMIN UR-MCNC: ABNORMAL MG/DL
APPEARANCE UR: CLEAR
BACTERIA #/AREA URNS HPF: ABNORMAL /HPF
BILIRUB UR QL STRIP: ABNORMAL
COLOR UR AUTO: YELLOW
GLUCOSE UR STRIP-MCNC: NEGATIVE MG/DL
HGB UR QL STRIP: NEGATIVE
KETONES UR STRIP-MCNC: ABNORMAL MG/DL
LEUKOCYTE ESTERASE UR QL STRIP: NEGATIVE
MUCOUS THREADS #/AREA URNS LPF: PRESENT /LPF
NITRATE UR QL: POSITIVE
PH UR STRIP: 5.5 [PH] (ref 5–8)
RBC #/AREA URNS AUTO: ABNORMAL /HPF
SP GR UR STRIP: >=1.03 (ref 1–1.03)
UROBILINOGEN UR STRIP-ACNC: 1 E.U./DL
WBC #/AREA URNS AUTO: ABNORMAL /HPF

## 2024-06-24 PROCEDURE — 87186 SC STD MICRODIL/AGAR DIL: CPT

## 2024-06-24 PROCEDURE — 87088 URINE BACTERIA CULTURE: CPT

## 2024-06-24 PROCEDURE — 99214 OFFICE O/P EST MOD 30 MIN: CPT | Mod: GC

## 2024-06-24 PROCEDURE — 87086 URINE CULTURE/COLONY COUNT: CPT

## 2024-06-24 PROCEDURE — 81001 URINALYSIS AUTO W/SCOPE: CPT

## 2024-06-24 RX ORDER — ONDANSETRON 4 MG/1
4 TABLET, ORALLY DISINTEGRATING ORAL EVERY 8 HOURS PRN
Qty: 30 TABLET | Refills: 1 | Status: SHIPPED | OUTPATIENT
Start: 2024-06-24

## 2024-06-24 RX ORDER — CIPROFLOXACIN 500 MG/1
500 TABLET, FILM COATED ORAL 2 TIMES DAILY
Qty: 10 TABLET | Refills: 0 | Status: SHIPPED | OUTPATIENT
Start: 2024-06-24 | End: 2024-06-24

## 2024-06-24 RX ORDER — RESPIRATORY SYNCYTIAL VIRUS VACCINE 120MCG/0.5
0.5 KIT INTRAMUSCULAR ONCE
Qty: 1 EACH | Refills: 0 | Status: CANCELLED | OUTPATIENT
Start: 2024-06-24 | End: 2024-06-24

## 2024-06-24 RX ORDER — CIPROFLOXACIN 500 MG/1
500 TABLET, FILM COATED ORAL 2 TIMES DAILY
Qty: 20 TABLET | Refills: 0 | Status: SHIPPED | OUTPATIENT
Start: 2024-06-24 | End: 2024-07-04

## 2024-06-24 ASSESSMENT — PATIENT HEALTH QUESTIONNAIRE - PHQ9: SUM OF ALL RESPONSES TO PHQ QUESTIONS 1-9: 15

## 2024-06-24 NOTE — PROGRESS NOTES
Physician Attestation   I, Jamal Cabrera MD, saw this patient and agree with the findings and plan of care as documented in the note.      Items personally reviewed/procedural attestation: vitals.    Jamal Cabrera MD

## 2024-06-24 NOTE — PROGRESS NOTES
Assessment & Plan     Weight loss  Abdominal distention  Significant weight fluctuation over the last 16 months but is now back up to 214 pounds with the last 1 being 185 pounds.  Continues to endorse vague abdominal pain with nausea and vomiting.  Previous workup including laboratory work and CT has been negative.  Will obtain labs again today and refer to GI for further workup.   ADDENDUM: Patient left without going to lab.  Called and left VM to return for labs.   - CBC with platelets; Future  - Comprehensive metabolic panel; Future  - Tissue transglutaminase loida IgA and IgG; Future  - Lipase; Future  - Adult Gen Surg  Referral; Future  - CBC with platelets; Future  - Comprehensive metabolic panel; Future  - Tissue transglutaminase loida IgA and IgG; Future  - Lipase; Future  - Adult GI  Referral - Consult Only; Future  - ondansetron (ZOFRAN ODT) 4 MG ODT tab; Take 1 tablet (4 mg) by mouth every 8 hours as needed for nausea    Ventral hernia without obstruction or gangrene  Patient also with large ventral hernia found on examination which may be related to symptoms above.  Will refer to general surgery.  - Adult Gen Surg  Referral; Future      Dysuria  Reporting dysuria for a couple of weeks on and off.  Does have history of diabetes.  Will obtain UA and treat as indicated.  - UA Macroscopic with reflex to Microscopic and Culture; Future  - UA Macroscopic with reflex to Microscopic and Culture  ADDENDUM: Patient with nitrates on exam.  Concern for complicated UTI given patient is a male.  Have ordered ciprofloxacin for 5 days and will await culture.    ETOH abuse  Tachycardia  Patient acutely inebriated during visit and tachycardic, intermittently falling asleep and tangential.  Believe that patient likely is chronically malnourished and may be related to symptoms as above.  Patient not willing to talk about it today.  Recommend cessation however.    Return in about 4 weeks (around  "7/22/2024) for Follow up.    Subjective   Ernesto is a 63 year old, presenting for the following health issues:  Follow Up    HPI     Genitourinary - Male  Onset/Duration: Couple weeks  Description:   Dysuria (painful urination): YES  Hematuria (blood in urine): No  Frequency: YES  Waking at night to urinate: YES  Hesitancy (delay in urine): YES  Retention (unable to empty): YES  Decrease in urinary flow: YES  Incontinence: YES  Progression of Symptoms:  improving. Came back alittle  Accompanying Signs & Symptoms:  Fever: No  Back/Flank pain: No  Urethral discharge: No  Testicle lumps/masses/pain: No  Nausea and/or vomiting: YES  Abdominal pain: YES  History:   History of frequent UTI s: No  History of kidney stones: No  History of hernias: No  Personal or Family history of Prostate problems: No  Precipitating or alleviating factors: None  Therapies tried and outcome: none    Nausea pills improved pain.   \"More days than not\"    Concern - Abdominal pain/Nausea  Onset: Several months  Description: Bloating stomach pain. Same as before. Still having significant symptoms  Intensity: severe  Progression of Symptoms:  same  Accompanying Signs & Symptoms: Nausea, vomiting, weight loss  Previous history of similar problem: Yes has been going on for several months and has had significantly less.  Precipitating factors:        Worsened by: Eating  Alleviating factors:        Improved by: Nothing  Therapies tried and outcome: None      Review of Systems  Constitutional, HEENT, cardiovascular, pulmonary, gi and gu systems are negative, except as otherwise noted.      Objective    /86 (BP Location: Right arm, Patient Position: Sitting, Cuff Size: Adult Regular)   Pulse 108   Temp 98  F (36.7  C) (Oral)   Resp 18   Ht 1.836 m (6' 0.3\")   Wt 97.1 kg (214 lb)   SpO2 95%   BMI 28.78 kg/m    Body mass index is 28.78 kg/m .  Physical Exam   GENERAL: alert, inebriated, tangential  NECK: no adenopathy, no asymmetry, masses, " or scars  RESP: lungs clear to auscultation - no rales, rhonchi or wheezes  CV: regular rhythm with tachycardia, normal S1 S2, no S3 or S4, no murmur, click or rub, no peripheral edema  ABDOMEN: soft, nontender, no hepatosplenomegaly, large ventral hernia  MS: no gross musculoskeletal defects noted, no edema    No results found for this or any previous visit (from the past 24 hour(s)).        Signed Electronically by: Jay Guevara DO

## 2024-06-25 ENCOUNTER — OFFICE VISIT (OUTPATIENT)
Dept: SURGERY | Facility: CLINIC | Age: 64
End: 2024-06-25
Payer: COMMERCIAL

## 2024-06-25 VITALS
HEIGHT: 72 IN | BODY MASS INDEX: 29.17 KG/M2 | DIASTOLIC BLOOD PRESSURE: 84 MMHG | WEIGHT: 215.4 LBS | SYSTOLIC BLOOD PRESSURE: 134 MMHG

## 2024-06-25 DIAGNOSIS — R14.0 ABDOMINAL DISTENTION: ICD-10-CM

## 2024-06-25 DIAGNOSIS — R63.4 WEIGHT LOSS: ICD-10-CM

## 2024-06-25 PROCEDURE — 99203 OFFICE O/P NEW LOW 30 MIN: CPT | Performed by: SURGERY

## 2024-06-25 NOTE — LETTER
6/25/2024      Ernesto Gomez  [address]  [place] [address]      Dear Colleague,    Thank you for referring your patient, Ernesto Gomez, to the [facility]. Please see a copy of my visit note below.    General Surgery H&P  Ernesto oGmez MRN# [id number]   Age/Sex: 63 year old male YOB: 1960     Reason for visit: Abdominal distention       Referring physician: Dr. Cabrera                    Assessment and Plan:   Assessment:  Diastases recti  Umbilical hernia  Abdominal distention  4.  Obesity  5.  Dysuria    63-year-old male presenting with abdominal distention which is likely multifactorial from his abdominal obesity as well as from diastases recti.  The patient does have a small 1 cm umbilical hernia which is nonsymptomatic at this time.    Due to the ongoing UTI, I would not recommend undergoing a umbilical hernia repair at this time.  With the implantation of mesh, I would not want to have that get infected.  Recommendation is for the patient to have his UTI completely resolved before we proceed with any further surgery for his umbilical hernia.    Plan:  -Patient was to follow-up with me for repair of the umbilical hernia once his urinary tract infection does improve.  -Continue with ongoing treatment for his UTI with Cipro per primary  -No acute surgical intervention for the diastases recti.          Chief Complaint:     Chief Complaint   Patient presents with     Consult For     Abdominal distention / hernia / CT N 05/06         History is obtained from the patient    HPI:   Ernesto Gomez is a 63 year old male who presents to the general surgery team with complaints of abdominal distention.  Patient states that he recently started having abdominal pain.  He was worked up and found to have a UTI.  Patient is currently being treated with antibiotics for the UTI.  During his treatment, the patient noticed that he had a  ridge that ran across his abdomen.  There was concerns for a hernia.  As result he came in for further evaluation.    Patient states he has no nausea no vomiting at this time.  Passing flatus having bowel movements.  He does not have much abdominal pain any further.  Patient did have dysuria.  Otherwise no further complaints.          Past Medical History:     Past Medical History:   Diagnosis Date     Alcohol abuse      Cervical disc disease      Cocaine addiction (H)      COPD (chronic obstructive pulmonary disease) (H) 6/15/2022     Diabetes mellitus, type 2 (H) 4/22/2024     DJD (degenerative joint disease) 12/13/2006     GERD (gastroesophageal reflux disease)      Tobacco abuse               Past Surgical History:     Past Surgical History:   Procedure Laterality Date     NECK SURGERY  07/13/2021    cervical spinal fusion             Social History:    reports that he has been smoking cigarettes. He has been exposed to tobacco smoke. He has never used smokeless tobacco. He reports current alcohol use. He reports that he does not currently use drugs.           Family History:   No family history on file.           Allergies:   No Known Allergies           Medications:     Prior to Admission medications    Medication Sig Start Date End Date Taking? Authorizing Provider   ibuprofen (ADVIL/MOTRIN) 800 MG tablet Take 800 mg by mouth every 8 hours as needed for moderate pain   Yes Reported, Patient   lisinopril (ZESTRIL) 20 MG tablet Take 1 tablet (20 mg) by mouth daily 4/22/24  Yes Reji Naidu MD   ondansetron (ZOFRAN ODT) 4 MG ODT tab Take 1 tablet (4 mg) by mouth every 8 hours as needed for nausea 6/24/24  Yes Jamal Cabrera MD   ciprofloxacin (CIPRO) 500 MG tablet Take 1 tablet (500 mg) by mouth 2 times daily for 10 days  Patient not taking: Reported on 6/25/2024 6/24/24 7/4/24  Jamal Cabrera MD              Review of Systems:   A 12 point Review of Systems is negative other than noted in the  "HPI            Physical Exam:   Patient Vitals for the past 24 hrs:   BP Height Weight   06/25/24 1418 134/84 1.836 m (6' 0.3\") 97.7 kg (215 lb 6.4 oz)        [unfilled]   Constitutional:   awake, alert, cooperative, no apparent distress, and appears stated age       Eyes:   PERRL, conjunctiva/corneas clear, EOM's intact; no scleral edema or icterus noted        ENT:   Normocephalic, without obvious abnormality, atraumatic, Lips, mucosa, and tongue normal        Hematologic / Lymphatic:   No lymphadenopathy       Lungs:   Normal respiratory effort, no accessory muscle use       Cardiovascular:   Regular rate and rhythm       Abdomen:   Soft, nondistended, nontender to palpation.  Positive diastases recti.  Patient does have a small 1 cm umbilical hernia.       Musculoskeletal:   No obvious swelling, bruising or deformity       Skin:   Skin color and texture normal for patient, no rashes or lesions              Data:        CT scan on CT abdomen does show a small umbilical hernia.    DO Tello Alva DO  General Surgeon  [facility]  Surgery [address] [address]?  Office: [phone]  Employed by - [employer] [employer]  Pager: [phone]       Again, thank you for allowing me to participate in the care of your patient.        Sincerely,        Tello Cerda DO  "

## 2024-06-25 NOTE — PROGRESS NOTES
General Surgery H&P  Ernesto Gomez MRN# [id number]   Age/Sex: 63 year old male YOB: 1960     Reason for visit: Abdominal distention       Referring physician: Dr. Cabrera                    Assessment and Plan:   Assessment:  Diastases recti  Umbilical hernia  Abdominal distention  4.  Obesity  5.  Dysuria    63-year-old male presenting with abdominal distention which is likely multifactorial from his abdominal obesity as well as from diastases recti.  The patient does have a small 1 cm umbilical hernia which is nonsymptomatic at this time.    Due to the ongoing UTI, I would not recommend undergoing a umbilical hernia repair at this time.  With the implantation of mesh, I would not want to have that get infected.  Recommendation is for the patient to have his UTI completely resolved before we proceed with any further surgery for his umbilical hernia.    Plan:  -Patient was to follow-up with me for repair of the umbilical hernia once his urinary tract infection does improve.  -Continue with ongoing treatment for his UTI with Cipro per primary  -No acute surgical intervention for the diastases recti.          Chief Complaint:     Chief Complaint   Patient presents with    Consult For     Abdominal distention / hernia / CT [facility] 05/06         History is obtained from the patient    HPI:   Ernesto Gomez is a 63 year old male who presents to the general surgery team with complaints of abdominal distention.  Patient states that he recently started having abdominal pain.  He was worked up and found to have a UTI.  Patient is currently being treated with antibiotics for the UTI.  During his treatment, the patient noticed that he had a ridge that ran across his abdomen.  There was concerns for a hernia.  As result he came in for further evaluation.    Patient states he has no nausea no vomiting at this time.  Passing flatus having bowel movements.  He does not have much abdominal pain any further.  Patient  "did have dysuria.  Otherwise no further complaints.          Past Medical History:     Past Medical History:   Diagnosis Date    Alcohol abuse     Cervical disc disease     Cocaine addiction (H)     COPD (chronic obstructive pulmonary disease) (H) 6/15/2022    Diabetes mellitus, type 2 (H) 4/22/2024    DJD (degenerative joint disease) 12/13/2006    GERD (gastroesophageal reflux disease)     Tobacco abuse               Past Surgical History:     Past Surgical History:   Procedure Laterality Date    NECK SURGERY  07/13/2021    cervical spinal fusion             Social History:    reports that he has been smoking cigarettes. He has been exposed to tobacco smoke. He has never used smokeless tobacco. He reports current alcohol use. He reports that he does not currently use drugs.           Family History:   No family history on file.           Allergies:   No Known Allergies           Medications:     Prior to Admission medications    Medication Sig Start Date End Date Taking? Authorizing Provider   ibuprofen (ADVIL/MOTRIN) 800 MG tablet Take 800 mg by mouth every 8 hours as needed for moderate pain   Yes Reported, Patient   lisinopril (ZESTRIL) 20 MG tablet Take 1 tablet (20 mg) by mouth daily 4/22/24  Yes Reji Naidu MD   ondansetron (ZOFRAN ODT) 4 MG ODT tab Take 1 tablet (4 mg) by mouth every 8 hours as needed for nausea 6/24/24  Yes Jamal Cabrera MD   ciprofloxacin (CIPRO) 500 MG tablet Take 1 tablet (500 mg) by mouth 2 times daily for 10 days  Patient not taking: Reported on 6/25/2024 6/24/24 7/4/24  Jamal Cabrera MD              Review of Systems:   A 12 point Review of Systems is negative other than noted in the HPI            Physical Exam:   Patient Vitals for the past 24 hrs:   BP Height Weight   06/25/24 1418 134/84 1.836 m (6' 0.3\") 97.7 kg (215 lb 6.4 oz)        [unfilled]   Constitutional:   awake, alert, cooperative, no apparent distress, and appears stated age       Eyes:   " PERRL, conjunctiva/corneas clear, EOM's intact; no scleral edema or icterus noted        ENT:   Normocephalic, without obvious abnormality, atraumatic, Lips, mucosa, and tongue normal        Hematologic / Lymphatic:   No lymphadenopathy       Lungs:   Normal respiratory effort, no accessory muscle use       Cardiovascular:   Regular rate and rhythm       Abdomen:   Soft, nondistended, nontender to palpation.  Positive diastases recti.  Patient does have a small 1 cm umbilical hernia.       Musculoskeletal:   No obvious swelling, bruising or deformity       Skin:   Skin color and texture normal for patient, no rashes or lesions              Data:        CT scan on CT abdomen does show a small umbilical hernia.    DO Tello Alva DO  General Surgeon  [facility]  Surgery [facility] - [address] [address]?  Office: [phone]  Employed by - [facility] [employer]  Pager: [phone]

## 2024-06-26 LAB
BACTERIA UR CULT: ABNORMAL
BACTERIA UR CULT: ABNORMAL

## 2024-10-03 NOTE — TELEPHONE ENCOUNTER
If pt returns call, please direct to me. Need to discuss behavior/tx of staff before he is scheduled for his next visit.    Patient verbalized understanding.   Communication/Consultation:   Discussed HEP and POC with patient today  Equipment provided today:  None  Recommendations/Intent for next treatment session: Next visit will focus on participation in graded exercise program to improve activity tolerance and functional indep..    >Total Treatment Billable Duration:  55 minutes   Time In: 800  Time Out: 900    Henok Augustin PT         Charge Capture  Events  [facility]  Appt Desk  Attendance Report     Future Appointments   Date Time Provider Department Center   10/9/2024  8:00 AM Henok Augustin PT [facility]   10/15/2024  8:00 AM Henok Augustin PT [facility]   10/16/2024 10:15 AM dEison Tenorio Jr., MD [facility]   10/17/2024  8:00 AM Henok Augustin PT [facility]   10/29/2024  8:00 AM Henok Augustin PT [facility]   10/31/2024  8:00 AM Henok Augustin PT [facility]

## 2025-01-27 ENCOUNTER — LAB REQUISITION (OUTPATIENT)
Dept: LAB | Facility: CLINIC | Age: 65
End: 2025-01-27
Payer: COMMERCIAL

## 2025-01-27 DIAGNOSIS — E11.59 TYPE 2 DIABETES MELLITUS WITH OTHER CIRCULATORY COMPLICATIONS (H): ICD-10-CM

## 2025-01-27 PROCEDURE — 82040 ASSAY OF SERUM ALBUMIN: CPT

## 2025-01-27 PROCEDURE — 80053 COMPREHEN METABOLIC PANEL: CPT | Mod: ORL

## 2025-01-27 PROCEDURE — 82247 BILIRUBIN TOTAL: CPT

## 2025-01-28 LAB
ALBUMIN SERPL BCG-MCNC: 4.1 G/DL (ref 3.5–5.2)
ALP SERPL-CCNC: 126 U/L (ref 40–150)
ALT SERPL W P-5'-P-CCNC: 63 U/L (ref 0–70)
ANION GAP SERPL CALCULATED.3IONS-SCNC: 13 MMOL/L (ref 7–15)
AST SERPL W P-5'-P-CCNC: 102 U/L (ref 0–45)
BILIRUB SERPL-MCNC: 0.6 MG/DL
BUN SERPL-MCNC: 9.4 MG/DL (ref 8–23)
CALCIUM SERPL-MCNC: 8.2 MG/DL (ref 8.8–10.4)
CHLORIDE SERPL-SCNC: 102 MMOL/L (ref 98–107)
CREAT SERPL-MCNC: 0.95 MG/DL (ref 0.67–1.17)
EGFRCR SERPLBLD CKD-EPI 2021: 90 ML/MIN/1.73M2
GLUCOSE SERPL-MCNC: 124 MG/DL (ref 70–99)
HCO3 SERPL-SCNC: 23 MMOL/L (ref 22–29)
POTASSIUM SERPL-SCNC: 3.6 MMOL/L (ref 3.4–5.3)
PROT SERPL-MCNC: 7.3 G/DL (ref 6.4–8.3)
SODIUM SERPL-SCNC: 138 MMOL/L (ref 135–145)

## 2025-02-03 ENCOUNTER — LAB REQUISITION (OUTPATIENT)
Dept: LAB | Facility: CLINIC | Age: 65
End: 2025-02-03
Payer: COMMERCIAL

## 2025-02-03 DIAGNOSIS — Z87.39 PERSONAL HISTORY OF OTHER DISEASES OF THE MUSCULOSKELETAL SYSTEM AND CONNECTIVE TISSUE: ICD-10-CM

## 2025-02-03 PROCEDURE — 84550 ASSAY OF BLOOD/URIC ACID: CPT

## 2025-02-04 LAB — URATE SERPL-MCNC: 10.2 MG/DL (ref 3.4–7)

## 2025-03-17 PROCEDURE — 87186 SC STD MICRODIL/AGAR DIL: CPT | Mod: ORL | Performed by: FAMILY MEDICINE

## 2025-03-17 PROCEDURE — 80053 COMPREHEN METABOLIC PANEL: CPT | Mod: ORL | Performed by: FAMILY MEDICINE

## 2025-03-17 PROCEDURE — 80053 COMPREHEN METABOLIC PANEL: CPT | Performed by: FAMILY MEDICINE

## 2025-03-17 PROCEDURE — 87186 SC STD MICRODIL/AGAR DIL: CPT | Performed by: FAMILY MEDICINE

## 2025-03-17 PROCEDURE — 87088 URINE BACTERIA CULTURE: CPT | Performed by: FAMILY MEDICINE

## 2025-03-18 ENCOUNTER — LAB REQUISITION (OUTPATIENT)
Dept: LAB | Facility: CLINIC | Age: 65
End: 2025-03-18
Payer: COMMERCIAL

## 2025-03-18 DIAGNOSIS — R55 SYNCOPE AND COLLAPSE: ICD-10-CM

## 2025-03-18 DIAGNOSIS — R06.02 SHORTNESS OF BREATH: ICD-10-CM

## 2025-03-19 LAB
ALBUMIN SERPL BCG-MCNC: 3.9 G/DL (ref 3.5–5.2)
ALP SERPL-CCNC: 200 U/L (ref 40–150)
ALT SERPL W P-5'-P-CCNC: 54 U/L (ref 0–70)
ANION GAP SERPL CALCULATED.3IONS-SCNC: 19 MMOL/L (ref 7–15)
AST SERPL W P-5'-P-CCNC: 91 U/L (ref 0–45)
BILIRUB SERPL-MCNC: 0.8 MG/DL
BUN SERPL-MCNC: 7.9 MG/DL (ref 8–23)
CALCIUM SERPL-MCNC: 9 MG/DL (ref 8.8–10.4)
CHLORIDE SERPL-SCNC: 96 MMOL/L (ref 98–107)
CREAT SERPL-MCNC: 1.02 MG/DL (ref 0.67–1.17)
EGFRCR SERPLBLD CKD-EPI 2021: 83 ML/MIN/1.73M2
GLUCOSE SERPL-MCNC: 284 MG/DL (ref 70–99)
HCO3 SERPL-SCNC: 18 MMOL/L (ref 22–29)
POTASSIUM SERPL-SCNC: 4.6 MMOL/L (ref 3.4–5.3)
PROT SERPL-MCNC: 7.6 G/DL (ref 6.4–8.3)
SODIUM SERPL-SCNC: 133 MMOL/L (ref 135–145)

## 2025-03-20 LAB
BACTERIA UR CULT: ABNORMAL
BACTERIA UR CULT: ABNORMAL

## 2025-06-19 ENCOUNTER — LAB REQUISITION (OUTPATIENT)
Dept: LAB | Facility: CLINIC | Age: 65
End: 2025-06-19
Payer: COMMERCIAL

## 2025-06-19 PROCEDURE — 82310 ASSAY OF CALCIUM: CPT

## 2025-06-20 LAB
ALBUMIN SERPL BCG-MCNC: 3.3 G/DL (ref 3.5–5.2)
ALP SERPL-CCNC: 287 U/L (ref 40–150)
ALT SERPL W P-5'-P-CCNC: 36 U/L (ref 0–70)
ANION GAP SERPL CALCULATED.3IONS-SCNC: 15 MMOL/L (ref 7–15)
AST SERPL W P-5'-P-CCNC: 159 U/L (ref 0–45)
BILIRUB SERPL-MCNC: 4.2 MG/DL
BUN SERPL-MCNC: 8.3 MG/DL (ref 8–23)
CALCIUM SERPL-MCNC: 8.6 MG/DL (ref 8.8–10.4)
CHLORIDE SERPL-SCNC: 100 MMOL/L (ref 98–107)
CREAT SERPL-MCNC: 0.77 MG/DL (ref 0.67–1.17)
EGFRCR SERPLBLD CKD-EPI 2021: >90 ML/MIN/1.73M2
GLUCOSE SERPL-MCNC: 131 MG/DL (ref 70–99)
HCO3 SERPL-SCNC: 22 MMOL/L (ref 22–29)
POTASSIUM SERPL-SCNC: 3.5 MMOL/L (ref 3.4–5.3)
PROT SERPL-MCNC: 7.5 G/DL (ref 6.4–8.3)
SODIUM SERPL-SCNC: 137 MMOL/L (ref 135–145)